# Patient Record
Sex: FEMALE | Race: WHITE | NOT HISPANIC OR LATINO | URBAN - METROPOLITAN AREA
[De-identification: names, ages, dates, MRNs, and addresses within clinical notes are randomized per-mention and may not be internally consistent; named-entity substitution may affect disease eponyms.]

---

## 2023-01-27 ENCOUNTER — OFFICE VISIT (OUTPATIENT)
Dept: OBGYN CLINIC | Facility: CLINIC | Age: 33
End: 2023-01-27

## 2023-01-27 VITALS
SYSTOLIC BLOOD PRESSURE: 112 MMHG | WEIGHT: 140 LBS | HEIGHT: 60 IN | BODY MASS INDEX: 27.48 KG/M2 | DIASTOLIC BLOOD PRESSURE: 76 MMHG

## 2023-01-27 DIAGNOSIS — Z01.419 WELL WOMAN EXAM WITH ROUTINE GYNECOLOGICAL EXAM: Primary | ICD-10-CM

## 2023-01-27 DIAGNOSIS — F84.0 AUTISM SPECTRUM DISORDER: ICD-10-CM

## 2023-01-27 DIAGNOSIS — Z12.4 ENCOUNTER FOR SCREENING FOR MALIGNANT NEOPLASM OF CERVIX: ICD-10-CM

## 2023-01-27 DIAGNOSIS — Z30.011 ENCOUNTER FOR INITIAL PRESCRIPTION OF CONTRACEPTIVE PILLS: ICD-10-CM

## 2023-01-27 DIAGNOSIS — Z11.51 SCREENING FOR HPV (HUMAN PAPILLOMAVIRUS): ICD-10-CM

## 2023-01-27 DIAGNOSIS — R10.2 PELVIC PAIN IN FEMALE: ICD-10-CM

## 2023-01-27 NOTE — PROGRESS NOTES
ASSESSMENT & PLAN:   Diagnoses and all orders for this visit:    Well woman exam with routine gynecological exam  -     Liquid-based pap, screening    Encounter for screening for malignant neoplasm of cervix  -     Liquid-based pap, screening    Screening for HPV (human papillomavirus)  -     Liquid-based pap, screening    Pelvic pain in female  -     Ambulatory referral to Physical Therapy; Future    Autism spectrum disorder  -     Ambulatory referral to Physical Therapy; Future    Encounter for initial prescription of contraceptive pills  -     Drospirenone 4 MG TABS; Take 1 tablet by mouth in the morning          The following were reviewed in today's visit: ASCCP guidelines, Gardisil vaccination, STD testing breast self exam, use and side effects of OCPs, exercise and healthy diet  Patient to return to office in yearly for annual exam      All questions have been answered to her satisfaction  CC:  Annual Gynecologic Examination  Chief Complaint   Patient presents with   • Gynecologic Exam     Pt is here for her yearly exam as a new pt  Pap is due today  Pt doing well pt would like to discuss bc for her period and pt states she had fibroids in the past  BP will be taken at end  HPI: Javier Ivey is a 34 y o  G0 who presents for annual gynecologic examination  She has the following concerns:  See attached form in media  Pt has a longstanding history of pelvic pain, abnormal bleeding  Was being treated and worked up in [place] but then moved to [place]  Currently going through a divorce so potentially losing health insurance in the near future  Has had pelvic ultrasound - records available in [facility]  Health Maintenance:    Exercise: intermittently - walking  Breast exams/breast awareness: no  Diet: well balanced diet      Past Medical History:   Diagnosis Date   • Fibroid 01/01/2016    Not sure when they were found but it was sometime after 2016       History reviewed   No pertinent surgical history  Past OB/Gyn History:  Period Cycle (Days): 34  Period Duration (Days): 5-6  Period Pattern: Regular  Menstrual Flow: Heavy  Menstrual Control: Maxi pad, Other (Comment) (period panties)  Dysmenorrhea: (!) Severe  Dysmenorrhea Symptoms: Cramping, Nausea, Headache, DiarrheaPatient's last menstrual period was 01/06/2023 (exact date)  Last Pap: 2/2022 : normal per patient  History of abnormal Pap smear: no  HPV vaccine completed: no    Patient is not currently sexually active  Has been with men in the past  Does not plan on being sexually active with men in the future       Current contraception: none      Family History  Family History   Problem Relation Age of Onset   • Asthma Mother    • Asthma Maternal Grandmother    • Ovarian cancer Maternal Grandmother    • Heart failure Maternal Grandmother    • Rashes / Skin problems Maternal Grandmother    • Stroke Maternal Grandmother    • Cancer Paternal Grandfather    • Asthma Brother        Family history of uterine or ovarian cancer: yes  Family history of breast cancer: no  Family history of colon cancer: no    Social History:  Social History     Socioeconomic History   • Marital status: Single     Spouse name: Not on file   • Number of children: Not on file   • Years of education: Not on file   • Highest education level: Not on file   Occupational History   • Not on file   Tobacco Use   • Smoking status: Never   • Smokeless tobacco: Never   Vaping Use   • Vaping Use: Never used   Substance and Sexual Activity   • Alcohol use: Never   • Drug use: Never   • Sexual activity: Not Currently     Partners: Female, Male     Birth control/protection: Abstinence, Condom Male   Other Topics Concern   • Not on file   Social History Narrative   • Not on file     Social Determinants of Health     Financial Resource Strain: Not on file   Food Insecurity: Not on file   Transportation Needs: Not on file   Physical Activity: Not on file   Stress: Not on file   Social Connections: Not on file   Intimate Partner Violence: Not on file   Housing Stability: Not on file     Domestic violence screen: negative    Allergies: Allergies   Allergen Reactions   • Gluten Meal - Food Allergy Abdominal Pain, Anxiety, Dizziness, Fatigue, GI Intolerance and Headache       Medications:    Current Outpatient Medications:   •  Drospirenone 4 MG TABS, Take 1 tablet by mouth in the morning, Disp: 90 tablet, Rfl: 4    Review of Systems:  Denies fevers, chills, unintentional weight loss, excessive fatigue, chest pain, shortness of breath, abdominal pain, nausea, vomiting, urinary incontinence, urinary frequency, vaginal bleeding, vaginal discharge  All other systems negative unless otherwise stated  Physical Exam:  /76 (BP Location: Right arm, Patient Position: Sitting, Cuff Size: Standard)   Ht 5' (1 524 m)   Wt 63 5 kg (140 lb)   LMP 01/06/2023 (Exact Date)   BMI 27 34 kg/m²  Body mass index is 27 34 kg/m²  GEN: The patient was alert and oriented x3, pleasant well-appearing female in no acute distress  HEENT:  Unremarkable, no anterior or posterior lymphadenopathy, no thyromegaly  CV:  Regular rate and rhythm, normal S1 and S2, no murmurs  RESP:  Clear to auscultation bilaterally, no wheezes, rales or rhonchi  BREAST:  Symmetric breasts with no palpable breast masses or obvious breast lesions  She has no retractions or nipple discharge  She has no axillary abnormalities or palpable masses  GI:  Soft, nontender, non-distended  MSK: bilateral lower extremities are nontender, no edema  : Normal appearing external female genitalia, normal appearing urethral meatus  On sterile speculum exam, normal appearing vaginal epithelium, no vaginal discharge, no bleeding, grossly normal appearing cervix  On bimanual exam, no cervical motion tenderness; uterus is smooth, mobile, 8 weeks size   There is tenderness with uterine manipulation, specifically with the abdominal hand and not the vaginal hand  No tenderness or fullness in the bilateral adnexa

## 2023-01-27 NOTE — PATIENT INSTRUCTIONS
Vikash Ribera meeting you today  I hope the following helps you remember what we talked about  Feel free to message or call the office if you have questions  Birth control [employer] OSEGUERA) - start taking it 5-7 days after the start of your next period  If you didn't get the savings card print out you can find it here: [email or web address]_Card_051722  pdf or just search [employer] on [employer] and navigate their website to find it  If your periods don't get better with Slynd we should consider doing another ultrasound to help figure out what is going on to cause you to have such symptomatic periods  Try pelvic floor physical therapy to help with pelvic pain - this can saumya helpful even if you are not planning to have penetrative sex  Consider the HPV vaccine  If you'd like to make an appointment for your first vaccination please call the office       All the best,  Dr Halle Guerrero

## 2023-01-30 LAB
HPV HR 12 DNA CVX QL NAA+PROBE: POSITIVE
HPV16 DNA CVX QL NAA+PROBE: NEGATIVE
HPV18 DNA CVX QL NAA+PROBE: NEGATIVE

## 2023-02-02 LAB
LAB AP GYN PRIMARY INTERPRETATION: NORMAL
Lab: NORMAL

## 2023-02-16 ENCOUNTER — OFFICE VISIT (OUTPATIENT)
Dept: FAMILY MEDICINE CLINIC | Facility: CLINIC | Age: 33
End: 2023-02-16

## 2023-02-16 VITALS
WEIGHT: 139 LBS | DIASTOLIC BLOOD PRESSURE: 78 MMHG | HEIGHT: 60 IN | RESPIRATION RATE: 16 BRPM | OXYGEN SATURATION: 99 % | TEMPERATURE: 98 F | SYSTOLIC BLOOD PRESSURE: 140 MMHG | BODY MASS INDEX: 27.29 KG/M2 | HEART RATE: 110 BPM

## 2023-02-16 DIAGNOSIS — E66.3 OVERWEIGHT WITH BODY MASS INDEX (BMI) OF 27 TO 27.9 IN ADULT: ICD-10-CM

## 2023-02-16 DIAGNOSIS — F84.0 AUTISM: ICD-10-CM

## 2023-02-16 DIAGNOSIS — Z13.6 SCREENING FOR CARDIOVASCULAR CONDITION: ICD-10-CM

## 2023-02-16 DIAGNOSIS — F90.2 ADHD (ATTENTION DEFICIT HYPERACTIVITY DISORDER), COMBINED TYPE: ICD-10-CM

## 2023-02-16 DIAGNOSIS — Z00.00 ANNUAL PHYSICAL EXAM: Primary | ICD-10-CM

## 2023-02-16 DIAGNOSIS — J45.998 SEASONAL ASTHMA: ICD-10-CM

## 2023-02-16 DIAGNOSIS — Z13.1 SCREENING FOR DIABETES MELLITUS: ICD-10-CM

## 2023-02-16 DIAGNOSIS — K90.0 CELIAC DISEASE: ICD-10-CM

## 2023-02-16 RX ORDER — LEVALBUTEROL TARTRATE 45 UG/1
1-2 AEROSOL, METERED ORAL EVERY 8 HOURS PRN
Qty: 15 G | Refills: 0 | Status: SHIPPED | OUTPATIENT
Start: 2023-02-16

## 2023-02-16 NOTE — PROGRESS NOTES
[facility]    NAME: Milly Amin  AGE: 34 y o  SEX: female  : 1990     DATE: 2023     Assessment and Plan:     Problem List Items Addressed This Visit        Digestive    Celiac disease       Other    ADHD (attention deficit hyperactivity disorder), combined type    Autism   Other Visit Diagnoses     Annual physical exam    -  Primary    Relevant Orders    Comprehensive metabolic panel    TSH, 3rd generation with Free T4 reflex    CBC and differential    Screening for diabetes mellitus        Screening for cardiovascular condition        Relevant Orders    Lipid panel    Seasonal asthma        Relevant Medications    levalbuterol (XOPENEX HFA) 45 mcg/act inhaler    Overweight with body mass index (BMI) of 27 to 27 9 in adult              Immunizations and preventive care screenings were discussed with patient today  Appropriate education was printed on patient's after visit summary  Counseling:  Alcohol/drug use: discussed moderation in alcohol intake, the recommendations for healthy alcohol use, and avoidance of illicit drug use  Dental Health: discussed importance of regular tooth brushing, flossing, and dental visits  Injury prevention: discussed safety/seat belts, safety helmets, smoke detectors, carbon dioxide detectors, and smoking near bedding or upholstery  Sexual health: discussed sexually transmitted diseases, partner selection, use of condoms, avoidance of unintended pregnancy, and contraceptive alternatives  · Exercise: the importance of regular exercise/physical activity was discussed  Recommend exercise 3-5 times per week for at least 30 minutes  BMI Counseling: Body mass index is 27 15 kg/m²   The BMI is above normal  Nutrition recommendations include decreasing portion sizes, encouraging healthy choices of fruits and vegetables, decreasing fast food intake, consuming healthier snacks, limiting drinks that contain sugar, moderation in carbohydrate intake and reducing intake of cholesterol  Exercise recommendations include moderate physical activity 150 minutes/week  No pharmacotherapy was ordered  Rationale for BMI follow-up plan is due to patient being overweight or obese  Depression Screening and Follow-up Plan: Patient's depression screening was positive with a PHQ-2 score of 3  Their PHQ-9 score was 9  No follow-ups on file  Chief Complaint:     Chief Complaint   Patient presents with   • New Patient Visit   • Physical Exam      History of Present Illness:     Adult Annual Physical   Patient here for a comprehensive physical exam  The patient reports problems - seasonal asthma- albutero lmakes her skay, anxious and caouses palpitations  Diet and Physical Activity  · Diet/Nutrition: well balanced diet and consuming 3-5 servings of fruits/vegetables daily  · Exercise: moderate cardiovascular exercise  Depression Screening  PHQ-2/9 Depression Screening    Little interest or pleasure in doing things: 1 - several days  Feeling down, depressed, or hopeless: 2 - more than half the days  Trouble falling or staying asleep, or sleeping too much: 1 - several days  Feeling tired or having little energy: 1 - several days  Poor appetite or overeatin - not at all  Feeling bad about yourself - or that you are a failure or have let yourself or your family down: 2 - more than half the days  Trouble concentrating on things, such as reading the newspaper or watching television: 1 - several days  Moving or speaking so slowly that other people could have noticed   Or the opposite - being so fidgety or restless that you have been moving around a lot more than usual: 0 - not at all  Thoughts that you would be better off dead, or of hurting yourself in some way: 1 - several days  PHQ-2 Score: 3  PHQ-2 Interpretation: POSITIVE depression screen  PHQ-9 Score: 9   PHQ-9 Interpretation: Mild depression        General Health  · Sleep: sleeps well  · Hearing: grossly normal   · Vision: goes for regular eye exams  · Dental: regular dental visits  /GYN Health  · Last menstrual period: Patient's last menstrual period was 02/06/2023 (approximate)  ·   · Contraceptive method: oral contraceptives  · History of STDs?: HPV     Review of Systems:     Review of Systems   Constitutional: Negative for fatigue and fever  HENT: Negative for congestion, facial swelling, mouth sores, rhinorrhea, sore throat and trouble swallowing  Eyes: Negative for pain and redness  Respiratory: Negative for cough, shortness of breath and wheezing  Cardiovascular: Negative for chest pain, palpitations and leg swelling  Gastrointestinal: Negative for abdominal pain, blood in stool, constipation, diarrhea and nausea  Genitourinary: Negative for dysuria, hematuria and urgency  Musculoskeletal: Negative for arthralgias, back pain and myalgias  Skin: Negative for rash and wound  Neurological: Negative for seizures, syncope and headaches  Hematological: Negative for adenopathy  Psychiatric/Behavioral: Negative for agitation and behavioral problems  Past Medical History:     Past Medical History:   Diagnosis Date   • Allergic     I always feel allergic to something, but I haven't been tested for anything specific   • Anxiety     I'm not sure that I've ever not had anxiety   • Asthma     Maybe? Everyone in my family has it, and I'm curious if I do too   • Depression     Probably on and off   I've never been diagnosed   • Eating disorder     In high school I had disordered eating because of a lot of pain and anxiety, but it was never diagnosed as an eating disorder   • Fibroid 01/01/2016    Not sure when they were found but it was sometime after 2016   • Headache(784 0)     Headaches happen every once in awhile, mostly around my period   • Visual impairment     I wear glasses, and have since I was 7 Past Surgical History:     History reviewed  No pertinent surgical history     Social History:     Social History     Socioeconomic History   • Marital status: Single     Spouse name: None   • Number of children: None   • Years of education: None   • Highest education level: None   Occupational History   • None   Tobacco Use   • Smoking status: Never   • Smokeless tobacco: Never   Vaping Use   • Vaping Use: Never used   Substance and Sexual Activity   • Alcohol use: Never   • Drug use: Never   • Sexual activity: Not Currently     Partners: Female, Male     Birth control/protection: Abstinence, Condom Male     Comment: male partner in the past, but not currently, and never again   Other Topics Concern   • None   Social History Narrative   • None     Social Determinants of Health     Financial Resource Strain: Not on file   Food Insecurity: Not on file   Transportation Needs: Not on file   Physical Activity: Not on file   Stress: Not on file   Social Connections: Not on file   Intimate Partner Violence: Not on file   Housing Stability: Not on file      Family History:     Family History   Problem Relation Age of Onset   • Asthma Mother    • Depression Mother         I don't know if she was every diagnosed, but she definitely has seemed depressed a lot   • Learning disabilities Mother         never diagnosed, but she suspects she has dsylexia   • ADD / ADHD Mother         highly suspected but not diagnosed   • Anxiety disorder Mother         not diagnosed but very anxious   • OCD Mother         not diagnosed but highly suspected   • Asthma Maternal Grandmother    • Ovarian cancer Maternal Grandmother    • Heart failure Maternal Grandmother    • Rashes / Skin problems Maternal Grandmother    • Stroke Maternal Grandmother    • Cancer Maternal Grandmother         ovarian cancer   • Anxiety disorder Maternal Grandmother    • Cancer Paternal Grandfather    • Asthma Brother    • Anxiety disorder Brother         Was treated for anxiety several years ago but is doing much better now and not on meds   • Anxiety disorder Father         not diagnosed but very anxious      Current Medications:     Current Outpatient Medications   Medication Sig Dispense Refill   • Drospirenone 4 MG TABS Take 1 tablet by mouth in the morning 90 tablet 4   • levalbuterol (XOPENEX HFA) 45 mcg/act inhaler Inhale 1-2 puffs every 8 (eight) hours as needed for wheezing 15 g 0     No current facility-administered medications for this visit  Allergies: Allergies   Allergen Reactions   • Albuterol Palpitations   • Gluten Meal - Food Allergy Abdominal Pain, Anxiety, Dizziness, Fatigue, GI Intolerance and Headache      Physical Exam:     /78 (BP Location: Left arm, Patient Position: Sitting, Cuff Size: Standard)   Pulse (!) 110   Temp 98 °F (36 7 °C) (Tympanic)   Resp 16   Ht 5' (1 524 m)   Wt 63 kg (139 lb)   LMP 02/06/2023 (Approximate)   SpO2 99%   BMI 27 15 kg/m²     Physical Exam  Vitals and nursing note reviewed  Constitutional:       Appearance: She is well-developed  HENT:      Head: Normocephalic and atraumatic  Right Ear: Tympanic membrane, ear canal and external ear normal       Left Ear: Tympanic membrane, ear canal and external ear normal       Nose: Nose normal       Mouth/Throat:      Pharynx: No oropharyngeal exudate  Eyes:      General: No scleral icterus  Right eye: No discharge  Left eye: No discharge  Conjunctiva/sclera: Conjunctivae normal       Pupils: Pupils are equal, round, and reactive to light  Neck:      Thyroid: No thyromegaly  Cardiovascular:      Rate and Rhythm: Normal rate and regular rhythm  Heart sounds: No murmur heard  No gallop  Pulmonary:      Effort: Pulmonary effort is normal  No respiratory distress  Breath sounds: Normal breath sounds  No wheezing or rales  Abdominal:      Palpations: Abdomen is soft  Tenderness: There is no abdominal tenderness  Musculoskeletal:         General: No tenderness or deformity  Cervical back: Normal range of motion  Right lower leg: No edema  Left lower leg: No edema  Lymphadenopathy:      Cervical: No cervical adenopathy  Skin:     General: Skin is warm  Capillary Refill: Capillary refill takes less than 2 seconds  Findings: No erythema or rash  Neurological:      Mental Status: She is alert and oriented to person, place, and time  Deep Tendon Reflexes: Reflexes normal    Psychiatric:         Behavior: Behavior normal          Thought Content:  Thought content normal          Judgment: Judgment normal           Toby Yost MD   [address] [place] [address]

## 2023-02-16 NOTE — PATIENT INSTRUCTIONS

## 2023-02-22 DIAGNOSIS — R53.83 OTHER FATIGUE: ICD-10-CM

## 2023-02-22 DIAGNOSIS — E55.9 VITAMIN D DEFICIENCY: ICD-10-CM

## 2023-02-22 DIAGNOSIS — K90.0 CELIAC DISEASE: Primary | ICD-10-CM

## 2023-02-23 ENCOUNTER — EVALUATION (OUTPATIENT)
Dept: PHYSICAL THERAPY | Facility: REHABILITATION | Age: 33
End: 2023-02-23

## 2023-02-23 DIAGNOSIS — N94.6 DYSMENORRHEA: ICD-10-CM

## 2023-02-23 DIAGNOSIS — R10.2 PELVIC PAIN IN FEMALE: Primary | ICD-10-CM

## 2023-02-23 DIAGNOSIS — F84.0 AUTISM SPECTRUM DISORDER: ICD-10-CM

## 2023-02-23 NOTE — PROGRESS NOTES
PT Evaluation     Today's date: 2023  Patient name: Edwige Garner  : 1990  MRN: [id number]  Referring provider: Candy Wright DO  Dx:   Encounter Diagnosis     ICD-10-CM    1  Pelvic pain in female  R10 2 Ambulatory referral to Physical Therapy      2  Autism spectrum disorder  F84 0 Ambulatory referral to Physical Therapy      3  Dysmenorrhea  N94 6           Start Time:   Stop Time:   Total time in clinic (min): 37 minutes    Assessment  Assessment details: Edwige Garner is a 34y o  year old female who presents to [facility] with Pelvic pain in female  (primary encounter diagnosis)  Autism spectrum disorder  Patient was referred for pelvic pain, specifically pain during her menstrual cycles, however expresses that this is not her main concern at this time  Majority of today's session focused on education as listed below  Patient deferred internal pelvic muscle exam but agreeable to external abdominal and pelvic floor assessment  She presents with tenderness to lower abdomen, hip musculature and low back  She would benefit from a program focused on diaphragmatic breathing, gentle core engagement and stretching to manage pain and improve coordination  Rachel Bhandari presents with the impairments as listed above and would benefit from Physical Therapy to address these impairments, improved quality of life and to maximize function  Impairments: activity intolerance, impaired physical strength and lacks appropriate home exercise program  Barriers to therapy: Financial    Goals  Short-Term Goals: 2-4 weeks  1  Patient will report decreased abdominal pain <5/10  Long-Term Goals: 4-6 weeks  1  Patient independent with HEP at time of discharge  Plan  Plan details: Thank you for opportunity to participate in the care of Edwige Garner      Patient would benefit from: skilled physical therapy and PT eval  Planned therapy interventions: abdominal trunk stabilization, home exercise program, coordination, therapeutic exercise, therapeutic activities, stretching, strengthening, patient education, postural training and neuromuscular re-education  Frequency: 1x week  Duration in visits: 5  Duration in weeks: 5  Plan of Care beginning date: 2/23/2023  Plan of Care expiration date: 3/30/2023  Treatment plan discussed with: patient        PT Pelvic Floor Subjective:   History of Present Illness:   Patient is a 34 y o  presenting to physical therapy with complaints of pelvic pain  Patient reports she has always had very painful periods and has worsened in the past few years  She reports she can get sharp, stabbing pains in the lower abdomen that radiates to the hips and down to her feet  However patient states that this pelvic pain is not her main concern  She states "I don't think that is the main cause of my issues  I want to work on being less wobbly  I can't feel my body in space and feel like my muscles aren't working right  I run into things a lot "     Social Support:     Lives with: brother and sister in law      Relationship status:     Work status: employed part time ()    History of Depression: yes  Diet and Exercise:    Diet:gluten free    Exercise type: walking    Walking the dog  Exercise intolerance - feels nauseous and dizzy   OB/ gyn History    Gestational History:     Prior Pregnancy: No      Menstrual History:      Menstrual irregularities regular menses (35 day cycles - heavy flow and clotting)    Painful periods:  Difficulty managing menstrual pain    Tolerates tampons: no (secondary to pain)    Birth control method: birth control pills (2 weeks )  Prescribed birth control for pain management  Bladder Function:     Voiding Difficulties negative for: urgency and frequent urination      Voiding Difficulties comments:     Voiding frequency: every 1-2 hours    Urinary leakage: no urine leakage    Nocturia (episodes per night): 1 (occasionally )    Painful urination: No      Fluid Intake Type: Water and tea    Intake (ounces): Intake (ounces) comment: Water: 18 oz x 6   Sparkling water: 1 can   Tea: 1-2 cups half caff  Bowel Function:     Bowel frequency: daily    Stool softener use: no stool softeners  Sexual Function:     Sexually Active:  Not sexually active (been over a year )    Pain during intercourse: Yes    Pain:     Current pain ratin    At best pain ratin    At worst pain rating:  10    Quality:  Cramping    Aggravating factors:  Menses    Duration of symptoms:  More than 1 day (5 days prior to before and throughout period but first 2 days of her cycle are the worst)    Relieving factors:  Medications and heat    Progression:  Worsening  Treatments:     Current treatment: physical therapy    Patient Goals:      Other patient goals:  Cant feel where she is in space, clenching muscles       Objective   Pelvic Floor Exam   Abdominal assessment: Tenderness at the linea alba - increased distally below umbilicus  Tenderness to external palpation over the bladder   Tenderness to bilateral piriformis and erector spinae         Flowsheet Rows    Flowsheet Row Most Recent Value   PT/OT G-Codes    Current Score 17   Projected Score 0             Precautions: autism, ADHD, anxiety/depression, SELF PAY      Manuals                                                                 Neuro Re-Ed             Diaphragmatic breathing nv            TA ADIM nv            DKTC             Davion pose             TA +  Hip abduction isometric             TA + Hip adduction isometric                          Ther Ex                                                    Ther Activity                                       Modalities

## 2023-03-02 ENCOUNTER — APPOINTMENT (OUTPATIENT)
Dept: PHYSICAL THERAPY | Facility: REHABILITATION | Age: 33
End: 2023-03-02

## 2023-03-02 ENCOUNTER — TELEPHONE (OUTPATIENT)
Dept: OBGYN CLINIC | Facility: CLINIC | Age: 33
End: 2023-03-02

## 2023-03-02 NOTE — TELEPHONE ENCOUNTER
----- Message from Jorge Mackey sent at 3/2/2023  2:40 PM EST -----  Regarding: When to start birth control  Contact: [phone]  Thanks for your response  I haven't been feeling much better, but I'll keep taking them and update again next month  I do have another question  Sorry for all of the questions  When can I expect to get my period? I have 2 active pills left  And then once I finish this pack, do I just immediately start with the new one, or do I have to wait until I get my period and have had it for a few days like I did when I started?

## 2023-03-08 ENCOUNTER — OFFICE VISIT (OUTPATIENT)
Dept: PHYSICAL THERAPY | Facility: REHABILITATION | Age: 33
End: 2023-03-08

## 2023-03-08 DIAGNOSIS — R10.2 PELVIC PAIN IN FEMALE: Primary | ICD-10-CM

## 2023-03-08 DIAGNOSIS — F84.0 AUTISM SPECTRUM DISORDER: ICD-10-CM

## 2023-03-08 DIAGNOSIS — N94.6 DYSMENORRHEA: ICD-10-CM

## 2023-03-08 NOTE — PROGRESS NOTES
Daily Note     Today's date: 3/8/2023  Patient name: Sheree Saba  : 1990  MRN: [id number]  Referring provider: Norah Cerda DO  Dx:   Encounter Diagnosis     ICD-10-CM    1  Pelvic pain in female  R10 2       2  Autism spectrum disorder  F84 0       3  Dysmenorrhea  N94 6           Start Time: 1111          Subjective: Patient reports no changes over the last two weeks  Patient reports she stopped taking the birth control pill 3 days ago because of side effects  She has not had her period but had some spotting/cramping yesterday  Objective: See treatment diary below      Assessment: Initiated plan of care this visit with a focus on diaphragmatic breathing, gentle core engagement and gentle hip isometrics  Modified exercises throughout session based on patient response and some difficulty with multi-tasking/coordination  She has difficulty with relaxation of hip adductors but improved with progressive repetitions  [facility] and PT mutually agree to transition to HEP at this time  Provided patient with HEP for overall program of breathing, stretching and gentle strengthening  [facility] is encouraged to contact PT with any questions or concerns in the future  Plan: Plan to discharge with comprehensive HEP for continued and maintained gains made in PT         Precautions: autism, ADHD, anxiety/depression, SELF PAY      Manuals 2/23 3/8                                                               Ther Ex             Diaphragmatic breathing nv Done           TA ADIM nv 10x5"           DKTC             Davion pose  2'           TA +  Hip abduction isometric  Pink 2x10           TA + Hip adduction isometric  5x focus on relaxation phase           Pball rocks A/P  10x                        Seated piriformis stretch  3x30" b/l           Standing hip adductor stretch  hard                        Ther Activity                                       Modalities

## 2023-03-15 ENCOUNTER — APPOINTMENT (OUTPATIENT)
Dept: PHYSICAL THERAPY | Facility: REHABILITATION | Age: 33
End: 2023-03-15

## 2023-04-28 ENCOUNTER — OFFICE VISIT (OUTPATIENT)
Dept: OBGYN CLINIC | Facility: CLINIC | Age: 33
End: 2023-04-28

## 2023-04-28 VITALS
DIASTOLIC BLOOD PRESSURE: 68 MMHG | HEIGHT: 60 IN | WEIGHT: 136 LBS | SYSTOLIC BLOOD PRESSURE: 124 MMHG | BODY MASS INDEX: 26.7 KG/M2

## 2023-04-28 DIAGNOSIS — N80.9 ENDOMETRIOSIS: ICD-10-CM

## 2023-04-28 DIAGNOSIS — Z84.2 FAMILY HISTORY OF ENDOMETRIOSIS IN FIRST DEGREE RELATIVE: ICD-10-CM

## 2023-04-28 DIAGNOSIS — R10.2 PELVIC PAIN: Primary | ICD-10-CM

## 2023-04-28 RX ORDER — ELAGOLIX 150 MG/1
1 TABLET, FILM COATED ORAL DAILY
Qty: 90 TABLET | Refills: 4 | Status: SHIPPED | OUTPATIENT
Start: 2023-04-28

## 2023-04-28 NOTE — PROGRESS NOTES
Assessment Anisha High was seen today for follow-up  Diagnoses and all orders for this visit:    Pelvic pain  -     Elagolix Sodium (Orilissa) 150 MG TABS; Take 1 tablet by mouth daily  -     US pelvis complete w transvaginal; Future    Endometriosis  -     Elagolix Sodium (Orilissa) 150 MG TABS; Take 1 tablet by mouth daily  -     US pelvis complete w transvaginal; Future    Family history of endometriosis in first degree relative  -     Elagolix Sodium (Orilissa) 150 MG TABS; Take 1 tablet by mouth daily  -     US pelvis complete w transvaginal; Future         Plan   Discussed possible etiologies of pain, including endometriosis  Reviewed etiology of endometriosis, strong genetic ties, diagnosis, treatment  Discussed that traditionally we diagnose endo via laparoscopy but with the Christianity of more effect medication options we try to avoid surgery if possible  Patient very motivated to avoid surgery  Will Rx Martin Shown to see if this helps her symptoms while not causing undesired side effects  Discussed common side effects of bleeding pattern changes, amenorrhea, night sweats, hot flushes  Provided savings card  Repeat US ordered to evaluate if fibroid growth could be contributing to pain  Encouraged pt to ask her mom if she has had genetic testing given the family history of ovarian cancer  Discussed HPV vaccination side effects    Pt will f/up with Dr Angel Loja for annual, sooner if needed  Subjective     Yusuf Hyman is a 34 y o  adult here for a problem visit  Patient is complaining of pelvic pain  Tried Slynd with no relief and very disruptive side effects  Felt brain fog, dissociation  Does not (understandably) want to try this again  Majority of pain is during ovulation and during her period  Feels swollen/bloating, pain radiates down her legs, has spotting - dark brown blood with some thin clots, nausea, lower back pain during ovulation  Also has pain during her period which she has always had   Has been at least 6-7 years since her pain has been not just during her period but also during ovulation  Went for an appointment with pelvic floor PT but did not feel like the recommendations were helpful  Tried some of the stretching techniques offered during her next episode of pain and had significant worsening of her pain  Mom has a history of endometriosis which was ultimately managed surgically, pt not sure of exactly what surgery she has had  Maternal Grandmother had ovarian cancer  Pt not sure if anyone in the family has had genetic testing  Patient Active Problem List   Diagnosis   • ADHD (attention deficit hyperactivity disorder), combined type   • Autism   • Celiac disease         Gynecologic History  Patient's last menstrual period was 2023 (exact date)  Obstetric History  OB History    Para Term  AB Living   0 0 0 0 0 0   SAB IAB Ectopic Multiple Live Births   0 0 0 0 0       Past Medical/Surgical/Family/Social History  The following portions of the patient's history were reviewed and updated as appropriate: allergies, current medications, past family history, past medical history, past social history, past surgical history and problem list     Allergies  Albuterol and Gluten meal - food allergy    Medications    Current Outpatient Medications:   •  levalbuterol (XOPENEX HFA) 45 mcg/act inhaler, Inhale 1-2 puffs every 8 (eight) hours as needed for wheezing, Disp: 15 g, Rfl: 0      Review of Systems  Constitutional: no fever, feels well  Gastrointestinal: no complaints of abdominal pain, constipation  Genitourinary : see HPI       Objective     /68 (BP Location: Left arm, Patient Position: Sitting, Cuff Size: Standard)   Ht 5' (1 524 m)   Wt 61 7 kg (136 lb)   LMP 2023 (Exact Date)   BMI 26 56 kg/m²     GEN: The patient was alert and oriented x3, pleasant well-appearing female in no acute distress     CV: Regular rate  PULM: nonlabored respirations  MSK: Normal gait  Skin: warm, dry  Neuro: no focal deficits  Psych: normal affect and judgement, cooperative

## 2023-05-10 LAB
ALBUMIN SERPL-MCNC: 4.3 G/DL (ref 3.6–5.1)
ALBUMIN/GLOB SERPL: 1.7 (CALC) (ref 1–2.5)
ALP SERPL-CCNC: 62 U/L (ref 31–125)
ALT SERPL-CCNC: 12 U/L (ref 6–29)
AST SERPL-CCNC: 19 U/L (ref 10–30)
BASOPHILS # BLD AUTO: 31 CELLS/UL (ref 0–200)
BASOPHILS NFR BLD AUTO: 0.6 %
BILIRUB SERPL-MCNC: 0.5 MG/DL (ref 0.2–1.2)
BUN SERPL-MCNC: 8 MG/DL (ref 7–25)
BUN/CREAT SERPL: NORMAL (CALC) (ref 6–22)
CALCIUM SERPL-MCNC: 9.3 MG/DL (ref 8.6–10.2)
CHLORIDE SERPL-SCNC: 104 MMOL/L (ref 98–110)
CHOLEST SERPL-MCNC: 161 MG/DL
CHOLEST/HDLC SERPL: 2.3 (CALC)
CO2 SERPL-SCNC: 25 MMOL/L (ref 20–32)
CREAT SERPL-MCNC: 0.81 MG/DL (ref 0.5–0.97)
EOSINOPHIL # BLD AUTO: 158 CELLS/UL (ref 15–500)
EOSINOPHIL NFR BLD AUTO: 3.1 %
ERYTHROCYTE [DISTWIDTH] IN BLOOD BY AUTOMATED COUNT: 12.6 % (ref 11–15)
GFR/BSA.PRED SERPLBLD CYS-BASED-ARV: 98 ML/MIN/1.73M2
GLOBULIN SER CALC-MCNC: 2.5 G/DL (CALC) (ref 1.9–3.7)
GLUCOSE SERPL-MCNC: 91 MG/DL (ref 65–99)
HCT VFR BLD AUTO: 41.3 % (ref 35–45)
HDLC SERPL-MCNC: 69 MG/DL
HGB BLD-MCNC: 14.1 G/DL (ref 11.7–15.5)
LDLC SERPL CALC-MCNC: 78 MG/DL (CALC)
LYMPHOCYTES # BLD AUTO: 1173 CELLS/UL (ref 850–3900)
LYMPHOCYTES NFR BLD AUTO: 23 %
MCH RBC QN AUTO: 30 PG (ref 27–33)
MCHC RBC AUTO-ENTMCNC: 34.1 G/DL (ref 32–36)
MCV RBC AUTO: 87.9 FL (ref 80–100)
MONOCYTES # BLD AUTO: 398 CELLS/UL (ref 200–950)
MONOCYTES NFR BLD AUTO: 7.8 %
NEUTROPHILS # BLD AUTO: 3341 CELLS/UL (ref 1500–7800)
NEUTROPHILS NFR BLD AUTO: 65.5 %
NONHDLC SERPL-MCNC: 92 MG/DL (CALC)
PLATELET # BLD AUTO: 241 THOUSAND/UL (ref 140–400)
PMV BLD REES-ECKER: 11.2 FL (ref 7.5–12.5)
POTASSIUM SERPL-SCNC: 3.7 MMOL/L (ref 3.5–5.3)
PROT SERPL-MCNC: 6.8 G/DL (ref 6.1–8.1)
RBC # BLD AUTO: 4.7 MILLION/UL (ref 3.8–5.1)
SODIUM SERPL-SCNC: 137 MMOL/L (ref 135–146)
T4 FREE SERPL-MCNC: 0.9 NG/DL (ref 0.8–1.8)
TRIGL SERPL-MCNC: 56 MG/DL
TSH SERPL-ACNC: 4.54 MIU/L
WBC # BLD AUTO: 5.1 THOUSAND/UL (ref 3.8–10.8)

## 2023-05-23 ENCOUNTER — HOSPITAL ENCOUNTER (OUTPATIENT)
Dept: RADIOLOGY | Age: 33
Discharge: HOME | End: 2023-05-23

## 2023-05-23 DIAGNOSIS — R10.2 PELVIC PAIN: ICD-10-CM

## 2023-05-23 DIAGNOSIS — Z84.2 FAMILY HISTORY OF ENDOMETRIOSIS IN FIRST DEGREE RELATIVE: ICD-10-CM

## 2023-05-23 DIAGNOSIS — N80.9 ENDOMETRIOSIS: ICD-10-CM

## 2023-08-30 ENCOUNTER — OFFICE VISIT (OUTPATIENT)
Dept: OBGYN CLINIC | Facility: CLINIC | Age: 33
End: 2023-08-30

## 2023-08-30 VITALS
HEIGHT: 60 IN | DIASTOLIC BLOOD PRESSURE: 78 MMHG | BODY MASS INDEX: 26.23 KG/M2 | WEIGHT: 133.6 LBS | SYSTOLIC BLOOD PRESSURE: 122 MMHG

## 2023-08-30 DIAGNOSIS — D25.2 INTRAMURAL AND SUBSEROUS LEIOMYOMA OF UTERUS: Primary | ICD-10-CM

## 2023-08-30 DIAGNOSIS — N84.0 ENDOMETRIAL POLYP: ICD-10-CM

## 2023-08-30 DIAGNOSIS — D25.1 INTRAMURAL AND SUBSEROUS LEIOMYOMA OF UTERUS: Primary | ICD-10-CM

## 2023-08-30 DIAGNOSIS — R10.2 PELVIC PAIN: ICD-10-CM

## 2023-08-30 PROCEDURE — 99215 OFFICE O/P EST HI 40 MIN: CPT | Performed by: OBSTETRICS & GYNECOLOGY

## 2023-08-31 NOTE — PROGRESS NOTES
Assessment/Plan:   Diagnoses and all orders for this visit:    Intramural and subserous leiomyoma of uterus    Pelvic pain    Endometrial polyp    We discussed her fibroid uterus and family planning and management options. At this time the large fibroids on her uterus are likely contributing to her pelvic pain associated with her menses. With the size of her fibroids we discussed that removal of her fibroids is likely to be more effective treatment rather than medical management. Emilio Coffey is not planning nor interested in future childbearing. We discussed that myomectomy is an option if future childbearing is desired due to risk of recurrence and risks related to the procedure including increased bleeding during the procedure. As Emilio Coffey does not desire future childbearing, discussed that removal of her entire uterus including her fibroids with it would be recommended. Discussed option for surigical management via total hysterectomy and bilateral salpingectomy explaining that it would entail removing uterus, cervix, and bilateral tubes. Discussed recommendation to leave ovaries if normal in appearance due to increased all cause mortality and continued benefit for cardiac and bone health as well as avoidance of surgical menopause. Reviewed recommendation for utilization of robotic assistance for her hysterectomy procedure and possible need for mini-laparotomy for removal of the specimen. Briefly reviewed preoperative procedure and the procedure in of itself. As patient has previously desired to avoid surgery, patient will consider our discussion today for possible treatment and reach out the office if surgical management and planning is desired.          I have spent a total time of 60 minutes on 08/30/23 in caring for this patient including Diagnostic results, Risks and benefits of tx options, Patient and family education, Impressions, Counseling / Coordination of care, Documenting in the medical record, Reviewing / ordering tests, medicine, procedures   and Obtaining or reviewing history  . Subjective:    Patient ID: Reta Libman is a 34 y.o. adult. Chief Complaint   Patient presents with   • Consult     Pt would like to discuss ultrasound that was completed on 05/23/2023. Pt states she is having excruciating abdominal pain. Maura Johnson is a 29yo G0 presenting today for discussion regarding her ultrasound results and her continued pelvic pain. She has been seeing Dr. Wenceslao Erickson in our office since she moved to the [facility] from [place] in January 2023. She has a long history of pelvic pain and abnormal bleeding. Prior ultrasound records are in media from prior evaluations in [place]. She was initially started on Slynd to help with pain that is exacerbated with ovulation and menstrual bleeding. Unfortunately slynd caused her to have exacerbations of suicidal ideations and Maura Johnson discontinued that medication. She was provided a prescription for Jean Claude March but was concerned for further side effects and thus did not start it. Her ultrasound is notable for a significant increase in size of her fibroids with 2 uterine fibroids at the uterine fundus measuring 8 and 9 cm. It was also notable for a 1 cm endometrial polyp within the uterine cavity. The following portions of the patient's history were reviewed and updated as appropriate: allergies, current medications, past family history, past medical history, past social history, past surgical history and problem list.    Review of Systems   Constitutional: Negative for chills, diaphoresis and fever. Respiratory: Negative for shortness of breath. Cardiovascular: Negative for chest pain. Gastrointestinal: Positive for abdominal pain. Negative for constipation, diarrhea, nausea and vomiting. Genitourinary: Positive for menstrual problem, pelvic pain and vaginal bleeding. Negative for difficulty urinating. Neurological: Positive for headaches.  Negative for dizziness. Psychiatric/Behavioral: The patient is nervous/anxious. Objective:  /78 (BP Location: Left arm, Patient Position: Sitting, Cuff Size: Standard)   Ht 5' (1.524 m)   Wt 60.6 kg (133 lb 9.6 oz)   LMP 08/07/2023 (Exact Date)   BMI 26.09 kg/m²   Physical Exam  Constitutional:       General: Juan Luis Mann is not in acute distress. Appearance: Normal appearance. Juan Luis Mann is not diaphoretic. HENT:      Head: Normocephalic and atraumatic. Pulmonary:      Effort: Pulmonary effort is normal. No respiratory distress. Musculoskeletal:      Right lower leg: No edema. Left lower leg: No edema. Neurological:      Mental Status: Juan Luis Mann is alert and oriented to person, place, and time. Skin:     General: Skin is warm and dry. Coloration: Skin is not pale. Psychiatric:         Mood and Affect: Mood normal.         Behavior: Behavior normal.         Thought Content: Thought content normal.         Judgment: Judgment normal.   Vitals and nursing note reviewed.

## 2023-09-19 ENCOUNTER — OFFICE VISIT (OUTPATIENT)
Dept: FAMILY MEDICINE CLINIC | Facility: CLINIC | Age: 33
End: 2023-09-19
Payer: COMMERCIAL

## 2023-09-19 VITALS
WEIGHT: 132.4 LBS | HEIGHT: 60 IN | SYSTOLIC BLOOD PRESSURE: 124 MMHG | BODY MASS INDEX: 26 KG/M2 | HEART RATE: 100 BPM | TEMPERATURE: 98.3 F | DIASTOLIC BLOOD PRESSURE: 70 MMHG | OXYGEN SATURATION: 99 %

## 2023-09-19 DIAGNOSIS — R79.89 ABNORMAL TSH: ICD-10-CM

## 2023-09-19 DIAGNOSIS — R05.1 ACUTE COUGH: Primary | ICD-10-CM

## 2023-09-19 PROCEDURE — 99213 OFFICE O/P EST LOW 20 MIN: CPT | Performed by: INTERNAL MEDICINE

## 2023-09-19 RX ORDER — METHYLPREDNISOLONE 4 MG/1
TABLET ORAL
Qty: 21 EACH | Refills: 0 | Status: SHIPPED | OUTPATIENT
Start: 2023-09-19

## 2023-09-19 RX ORDER — AZITHROMYCIN 250 MG/1
TABLET, FILM COATED ORAL
Qty: 6 TABLET | Refills: 0 | Status: SHIPPED | OUTPATIENT
Start: 2023-09-19 | End: 2023-09-24

## 2023-09-19 RX ORDER — CETIRIZINE HYDROCHLORIDE 10 MG/1
10 TABLET ORAL DAILY
COMMUNITY

## 2023-09-19 RX ORDER — TRIAMCINOLONE ACETONIDE 55 UG/1
SPRAY, METERED NASAL DAILY
COMMUNITY

## 2023-09-19 NOTE — PROGRESS NOTES
Name: Adarsh Baldwin      : 1990      MRN: [id number]  Encounter Provider: Jana Ryan MD  Encounter Date: 2023   Encounter department: [facility]     1. Acute cough  -     azithromycin (Zithromax) 250 mg tablet; Take 2 tablets (500 mg total) by mouth daily for 1 day, THEN 1 tablet (250 mg total) daily for 4 days. -     methylPREDNISolone 4 MG tablet therapy pack; Use as directed on package    2. Abnormal TSH  -     TSH, 3rd generation with Free T4 reflex; Future           Subjective      She has had a nonproductive cough has been unresponsive to an inhaler for several weeks. Cough  This is a new problem. The current episode started more than 1 month ago. The problem has been waxing and waning. The problem occurs every few minutes. The cough is non-productive. Associated symptoms include shortness of breath. Pertinent negatives include no chest pain, chills, ear pain, fever, headaches, heartburn, hemoptysis, myalgias, nasal congestion, postnasal drip, rash, sore throat, weight loss or wheezing. The symptoms are aggravated by dust, exercise and fumes. Adarsh Baldwin has tried a beta-agonist inhaler, cool air and OTC cough suppressant for the symptoms. The treatment provided mild relief. There is no history of asthma, bronchitis or environmental allergies. Review of Systems   Constitutional: Negative for chills, fever and weight loss. HENT: Negative for congestion, ear pain, postnasal drip and sore throat. Eyes: Negative for pain and visual disturbance. Respiratory: Positive for cough and shortness of breath. Negative for hemoptysis and wheezing. Cardiovascular: Negative for chest pain and palpitations. Gastrointestinal: Negative for abdominal pain, heartburn and vomiting. Genitourinary: Negative for dysuria and hematuria. Musculoskeletal: Negative for arthralgias, back pain and myalgias. Skin: Negative for color change and rash. Allergic/Immunologic: Negative for environmental allergies. Neurological: Negative for seizures, syncope and headaches. All other systems reviewed and are negative. Current Outpatient Medications on File Prior to Visit   Medication Sig   • cetirizine (ZyrTEC) 10 mg tablet Take 10 mg by mouth daily   • levalbuterol (XOPENEX HFA) 45 mcg/act inhaler Inhale 1-2 puffs every 8 (eight) hours as needed for wheezing   • Triamcinolone Acetonide (Nasacort Allergy 24HR) 55 MCG/ACT nasal spray by Each Nare route daily   • [DISCONTINUED] Elagolix Sodium (Orilissa) 150 MG TABS Take 1 tablet by mouth daily       Objective     /70 (BP Location: Right arm, Patient Position: Sitting, Cuff Size: Standard)   Pulse 100   Temp 98.3 °F (36.8 °C) (Temporal)   Ht 5' (1.524 m)   Wt 60.1 kg (132 lb 6.4 oz)   LMP 08/07/2023 (Exact Date)   SpO2 99%   BMI 25.86 kg/m²     Physical Exam  Constitutional:       General: Steven Grullon is not in acute distress. Appearance: Normal appearance. Steven Clear is well-developed. Steven Clear is not ill-appearing. HENT:      Head: Normocephalic. Right Ear: Tympanic membrane and external ear normal.      Left Ear: Tympanic membrane and external ear normal.      Nose: Nose normal. No congestion. Mouth/Throat:      Mouth: Mucous membranes are moist.      Pharynx: No oropharyngeal exudate. Eyes:      Conjunctiva/sclera: Conjunctivae normal.      Pupils: Pupils are equal, round, and reactive to light. Neck:      Thyroid: No thyromegaly. Vascular: No JVD. Cardiovascular:      Rate and Rhythm: Normal rate and regular rhythm. Heart sounds: Normal heart sounds. No murmur heard. No gallop. Pulmonary:      Effort: Pulmonary effort is normal. No respiratory distress. Breath sounds: Normal breath sounds. No wheezing, rhonchi or rales. Abdominal:      General: Bowel sounds are normal. There is no distension. Palpations: Abdomen is soft.  There is no mass.      Tenderness: There is no abdominal tenderness. Musculoskeletal:         General: No tenderness. Normal range of motion. Cervical back: Normal range of motion and neck supple. Right lower leg: No edema. Left lower leg: No edema. Lymphadenopathy:      Cervical: No cervical adenopathy. Skin:     General: Skin is warm and dry. Findings: No rash. Neurological:      General: No focal deficit present. Mental Status: Eleanor Sahni is alert and oriented to person, place, and time. Cranial Nerves: No cranial nerve deficit. Coordination: Coordination normal.   Psychiatric:         Behavior: Behavior normal.         Thought Content:  Thought content normal.         Judgment: Judgment normal.       Corrinne Balloon, MD

## 2023-10-04 ENCOUNTER — RA CDI HCC (OUTPATIENT)
Dept: OTHER | Facility: HOSPITAL | Age: 33
End: 2023-10-04

## 2023-10-04 NOTE — PROGRESS NOTES
[address] [facility] coding opportunities       Chart reviewed, no opportunity found: CHART REVIEWED, NO OPPORTUNITY FOUND        Patients Insurance        Commercial Insurance: Ming Little

## 2023-10-11 ENCOUNTER — OFFICE VISIT (OUTPATIENT)
Dept: FAMILY MEDICINE CLINIC | Facility: CLINIC | Age: 33
End: 2023-10-11
Payer: COMMERCIAL

## 2023-10-11 VITALS
WEIGHT: 133 LBS | RESPIRATION RATE: 16 BRPM | SYSTOLIC BLOOD PRESSURE: 112 MMHG | BODY MASS INDEX: 26.11 KG/M2 | HEIGHT: 60 IN | HEART RATE: 89 BPM | OXYGEN SATURATION: 96 % | DIASTOLIC BLOOD PRESSURE: 74 MMHG | TEMPERATURE: 97.3 F

## 2023-10-11 DIAGNOSIS — J45.998 SEASONAL ASTHMA: ICD-10-CM

## 2023-10-11 DIAGNOSIS — F90.2 ADHD (ATTENTION DEFICIT HYPERACTIVITY DISORDER), COMBINED TYPE: Primary | ICD-10-CM

## 2023-10-11 DIAGNOSIS — R79.89 ABNORMAL TSH: ICD-10-CM

## 2023-10-11 PROCEDURE — 99214 OFFICE O/P EST MOD 30 MIN: CPT | Performed by: FAMILY MEDICINE

## 2023-10-11 RX ORDER — DEXTROAMPHETAMINE SACCHARATE, AMPHETAMINE ASPARTATE MONOHYDRATE, DEXTROAMPHETAMINE SULFATE AND AMPHETAMINE SULFATE 1.25; 1.25; 1.25; 1.25 MG/1; MG/1; MG/1; MG/1
5 CAPSULE, EXTENDED RELEASE ORAL EVERY MORNING
Qty: 30 CAPSULE | Refills: 0 | Status: SHIPPED | OUTPATIENT
Start: 2023-10-11

## 2023-10-11 NOTE — PROGRESS NOTES
Depression Screening and Follow-up Plan: Patient was screened for depression during today's encounter. They screened negative with a PHQ-2 score of 1. Assessment/Plan:    1. ADHD (attention deficit hyperactivity disorder), combined type  -     amphetamine-dextroamphetamine (ADDERALL XR, 5MG,) 5 MG 24 hr capsule; Take 1 capsule (5 mg total) by mouth every morning Max Daily Amount: 5 mg    2. Abnormal TSH    3. Seasonal asthma  -     Spacer Device for Inhaler        Patient Instructions   Try guaifenisin to break up the mucus (mucinex, delsym)  Use xopenex inhaler to open the chest, can take every 6 hours as needed for wheezing  Trial of adderall, recheck in office in one month  Consider levothyroxine for thyroid which may be responsible for a lot of your symptoms, TSH usually best below 2.0, you are at 4.84    Return in about 4 weeks (around 11/8/2023). Subjective:      Patient ID: Yumiko Coronado is a 28 y.o. adult. Chief Complaint   Patient presents with    chronic conditions       Here for f/u. C/o ADHD, dx'd in early 2020 by a psychologist. Has never been treated. And didn't f/u psychiatry. And then pandemic hit. 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 Trouble sustaining income to support herself. Living with her parents. Autism, sensory processing. Had anxiety since childhood. Was a straight A student, but was a quiet student. Was not offered any accommodations. Sees therapist twice a month. Cannot finish tasks, structure imposed by other people, makes her panic. Social anxiety. Still feels some tightness in her chest, can't expectorate, doesn't have the coordination for the inhaler. She admits to being clumsy. Wakes a few times a night.          The following portions of the patient's history were reviewed and updated as appropriate: allergies, current medications, past family history, past medical history, past social history, past surgical history and problem list.    Review of Systems   Constitutional: Negative for fatigue and fever. HENT:  Negative for congestion. Eyes:  Negative for visual disturbance. Respiratory:  Positive for wheezing. Negative for chest tightness and shortness of breath. Cardiovascular:  Negative for chest pain and palpitations. Gastrointestinal:  Negative for abdominal pain. Genitourinary:  Negative for difficulty urinating. Musculoskeletal:  Negative for arthralgias. Neurological:  Negative for headaches. Hematological:  Does not bruise/bleed easily. Psychiatric/Behavioral:  Positive for decreased concentration. The patient is nervous/anxious and is hyperactive. Current Outpatient Medications   Medication Sig Dispense Refill    amphetamine-dextroamphetamine (ADDERALL XR, 5MG,) 5 MG 24 hr capsule Take 1 capsule (5 mg total) by mouth every morning Max Daily Amount: 5 mg 30 capsule 0    cetirizine (ZyrTEC) 10 mg tablet Take 10 mg by mouth daily      levalbuterol (XOPENEX HFA) 45 mcg/act inhaler Inhale 1-2 puffs every 8 (eight) hours as needed for wheezing 15 g 0    Triamcinolone Acetonide (Nasacort Allergy 24HR) 55 MCG/ACT nasal spray by Each Nare route daily      methylPREDNISolone 4 MG tablet therapy pack Use as directed on package (Patient not taking: Reported on 10/11/2023) 21 each 0     No current facility-administered medications for this visit. Objective:    /74 (BP Location: Right arm, Patient Position: Sitting, Cuff Size: Standard)   Pulse 89   Temp (!) 97.3 °F (36.3 °C) (Temporal)   Resp 16   Ht 5' (1.524 m)   Wt 60.3 kg (133 lb)   LMP 10/05/2023 (Approximate)   SpO2 96%   BMI 25.97 kg/m²        Physical Exam  Vitals reviewed. Constitutional:       Appearance: Normal appearance. Wilfredo Erwin is well-developed. Cardiovascular:      Rate and Rhythm: Normal rate and regular rhythm. Heart sounds: Normal heart sounds. Pulmonary:      Effort: Pulmonary effort is normal.      Breath sounds: Normal breath sounds.    Skin: General: Skin is warm. Neurological:      General: No focal deficit present. Mental Status: Toribio Gutierrez is alert and oriented to person, place, and time. Psychiatric:         Mood and Affect: Mood normal.         Behavior: Behavior normal.         Thought Content:  Thought content normal.         Judgment: Judgment normal.                Edison Triana MD

## 2023-10-11 NOTE — PATIENT INSTRUCTIONS
Try guaifenisin to break up the mucus (mucinex, delsym)  Use xopenex inhaler to open the chest, can take every 6 hours as needed for wheezing  Trial of adderall, recheck in office in one month  Consider levothyroxine for thyroid which may be responsible for a lot of your symptoms, TSH usually best below 2.0, you are at 4.84

## 2023-11-18 PROBLEM — R05.1 ACUTE COUGH: Status: RESOLVED | Noted: 2023-09-19 | Resolved: 2023-11-18

## 2023-12-11 ENCOUNTER — PATIENT MESSAGE (OUTPATIENT)
Dept: FAMILY MEDICINE CLINIC | Facility: CLINIC | Age: 33
End: 2023-12-11

## 2023-12-11 DIAGNOSIS — J45.998 SEASONAL ASTHMA: ICD-10-CM

## 2023-12-12 RX ORDER — LEVALBUTEROL TARTRATE 45 UG/1
1-2 AEROSOL, METERED ORAL EVERY 8 HOURS PRN
Qty: 15 G | Refills: 5 | Status: SHIPPED | OUTPATIENT
Start: 2023-12-12

## 2023-12-15 ENCOUNTER — PATIENT MESSAGE (OUTPATIENT)
Dept: OBGYN CLINIC | Facility: CLINIC | Age: 33
End: 2023-12-15

## 2024-01-03 ENCOUNTER — PATIENT MESSAGE (OUTPATIENT)
Dept: OBGYN CLINIC | Facility: CLINIC | Age: 34
End: 2024-01-03

## 2024-01-12 DIAGNOSIS — D25.2 INTRAMURAL AND SUBSEROUS LEIOMYOMA OF UTERUS: Primary | ICD-10-CM

## 2024-01-12 DIAGNOSIS — N80.9 ENDOMETRIOSIS: ICD-10-CM

## 2024-01-12 DIAGNOSIS — D25.1 INTRAMURAL AND SUBSEROUS LEIOMYOMA OF UTERUS: Primary | ICD-10-CM

## 2024-01-12 DIAGNOSIS — R10.2 PELVIC PAIN: ICD-10-CM

## 2024-01-15 ENCOUNTER — TELEPHONE (OUTPATIENT)
Dept: HEMATOLOGY ONCOLOGY | Facility: CLINIC | Age: 34
End: 2024-01-15

## 2024-01-15 NOTE — TELEPHONE ENCOUNTER
I called [facility] in response to a referral that was received for patient to establish care with Gynecologic Oncology.     Outreach was made to complete patient's intake questionnaire .    I left a voicemail explaining the reason for my call and advised patient to call [facility] at [phone].  Another attempt will be made to contact patient.

## 2024-01-16 ENCOUNTER — TELEPHONE (OUTPATIENT)
Dept: HEMATOLOGY ONCOLOGY | Facility: CLINIC | Age: 34
End: 2024-01-16

## 2024-01-16 NOTE — TELEPHONE ENCOUNTER
I called [facility] in response to a referral that was received for patient to establish care with Gynecologic Oncology.      Outreach was made to complete patient's intake questionnaire .     I left a voicemail explaining the reason for my call and advised patient to call [facility] at [phone].  Another attempt will be made to contact patient.

## 2024-01-17 ENCOUNTER — TELEPHONE (OUTPATIENT)
Dept: HEMATOLOGY ONCOLOGY | Facility: CLINIC | Age: 34
End: 2024-01-17

## 2024-01-17 NOTE — TELEPHONE ENCOUNTER
I called [facility] in response to a referral that was received for patient to establish care with Gynecologic Oncology.     Outreach was made to complete patient's intake questionnaire .    I left a voicemail explaining the reason for my call and advised patient to call [facility] at [phone].  This is the third attempt to schedule patient unsuccessfully. The referral has been closed, a [employer] message has been sent to patient (if applicable) and a letter has been sent to the address on file.

## 2024-01-25 ENCOUNTER — ANNUAL EXAM (OUTPATIENT)
Dept: OBGYN CLINIC | Facility: CLINIC | Age: 34
End: 2024-01-25
Payer: COMMERCIAL

## 2024-01-25 VITALS
BODY MASS INDEX: 25.6 KG/M2 | SYSTOLIC BLOOD PRESSURE: 118 MMHG | HEIGHT: 60 IN | WEIGHT: 130.4 LBS | DIASTOLIC BLOOD PRESSURE: 80 MMHG

## 2024-01-25 DIAGNOSIS — D25.2 INTRAMURAL AND SUBSEROUS LEIOMYOMA OF UTERUS: ICD-10-CM

## 2024-01-25 DIAGNOSIS — R10.2 PELVIC PAIN: ICD-10-CM

## 2024-01-25 DIAGNOSIS — Z12.4 ENCOUNTER FOR SCREENING FOR MALIGNANT NEOPLASM OF CERVIX: ICD-10-CM

## 2024-01-25 DIAGNOSIS — Z11.51 SCREENING FOR HPV (HUMAN PAPILLOMAVIRUS): ICD-10-CM

## 2024-01-25 DIAGNOSIS — Z01.419 WELL WOMAN EXAM WITH ROUTINE GYNECOLOGICAL EXAM: Primary | ICD-10-CM

## 2024-01-25 DIAGNOSIS — D25.1 INTRAMURAL AND SUBSEROUS LEIOMYOMA OF UTERUS: ICD-10-CM

## 2024-01-25 PROCEDURE — 99395 PREV VISIT EST AGE 18-39: CPT | Performed by: OBSTETRICS & GYNECOLOGY

## 2024-01-25 PROCEDURE — G0145 SCR C/V CYTO,THINLAYER,RESCR: HCPCS | Performed by: OBSTETRICS & GYNECOLOGY

## 2024-01-25 PROCEDURE — G0476 HPV COMBO ASSAY CA SCREEN: HCPCS | Performed by: OBSTETRICS & GYNECOLOGY

## 2024-01-25 NOTE — PROGRESS NOTES
ASSESSMENT & PLAN:   Diagnoses and all orders for this visit:    Well woman exam with routine gynecological exam  -     Liquid-based pap, screening  -     HPV High Risk    Encounter for screening for malignant neoplasm of cervix  -     Liquid-based pap, screening  -     HPV High Risk    Screening for HPV (human papillomavirus)  -     Liquid-based pap, screening  -     HPV High Risk    Intramural and subserous leiomyoma of uterus    Pelvic pain    We briefly discussed anticipation of surgical procedure to be done via robotic total laparoscopic hysterectomy.  Discussed that she does have a bulky fibroid uterus and may require a mini laparotomy for removal of the specimen.  I discussed the procedure in general as she will be having a surgical consultation with Dr. Yu and explained that Dr. Yu's approach will likely be explained during her surgical consultation.  Reviewed general postoperative pain control as patient does have anxiety regarding pain control after surgery.  We discussed that this is typically an outpatient surgery unless a large laparotomy is needed to perform the procedure.      The following were reviewed in today's visit: ASCCP guidelines, Gardisil vaccination, STD testing breast self exam, use and side effects of OCPs, menopause, exercise, and healthy diet.    Patient to return to office in yearly for annual exam.     All questions have been answered to her satisfaction.        CC:  Annual Gynecologic Examination  Chief Complaint   Patient presents with    Gynecologic Exam     Annual exam, pap indicated. Pt is well, would like to discuss upcoming hysterectomy, however no gyn concerns at this time.   Pap 23 wnl,  +HPV other HR, repeat in 1 year        HPI: Taylor John is a 33 y.o.  who presents for annual gynecologic examination.  She has the following concerns:    Patient has an upcoming consultation with GYN oncologist, Dr. Yu, to discuss surgical management of her large  fibroid uterus and pelvic pain.  She is currently scheduled for a robotic hysterectomy with myself in April 2024 however she has been experiencing increased pelvic pain even with sitting and at rest in the last few months.  She is hoping that Dr. Yu may be able to perform her surgical procedure sooner that she is currently scheduled with myself.  She does have questions regarding the hysterectomy procedure.    Taylor identifies as nonbinary.  She is sexually active with a female partner and is monogamous.  We did discuss that her dysphoria that may occur with ovulation and may also contribute to her overall nauseousness and pain associated with her menses may not fully resolve with surgical management.  We did discuss that her bulky uterus is likely causing bulk symptoms within her pelvis contributing to her pelvic pain as she did have a significant increase in size of her uterine fibroids over the last year.  We discussed that hormonal medication may be considered for ovulation suppression however Taylor has had poor experience with hormonal medication in the past due to increases in suicidal ideation.      Health Maintenance:    Exercise: infrequently  Breast exams/breast awareness: yes    Past Medical History:   Diagnosis Date    Allergic     I always feel allergic to something, but I haven't been tested for anything specific    Anxiety     I'm not sure that I've ever not had anxiety    Asthma     Maybe? Everyone in my family has it, and I'm curious if I do too    Depression     Probably on and off. I've never been diagnosed    Eating disorder     In high school I had disordered eating because of a lot of pain and anxiety, but it was never diagnosed as an eating disorder    Fibroid 01/01/2016    Not sure when they were found but it was sometime after 2016    Headache(784.0)     Headaches happen every once in awhile, mostly around my period    Visual impairment     I wear glasses, and have since I was 7        Past Surgical History:   Procedure Laterality Date    NO PAST SURGERIES         Past OB/Gyn History:  Period Duration (Days): 5  Period Pattern: Regular  Menstrual Flow: Heavy  Menstrual Control: Maxi pad, Thin pad, Panty linerPatient's last menstrual period was 01/06/2024 (exact date).    Last Pap: 2023 :  Normal cytology, high risk positive HPV  History of abnormal Pap smear: yes - as above  HPV vaccine completed: no    Patient is currently sexually active.   STD testing: no  Current contraception:  same sex partner      Family History  Family History   Problem Relation Age of Onset    Asthma Mother     Depression Mother         I don't know if she was every diagnosed, but she definitely has seemed depressed a lot    Learning disabilities Mother         never diagnosed, but she suspects she has dsylexia    ADD / ADHD Mother         highly suspected but not diagnosed    Anxiety disorder Mother         not diagnosed but very anxious    OCD Mother         not diagnosed but highly suspected    Anxiety disorder Father         not diagnosed but very anxious    Asthma Brother     Anxiety disorder Brother         Was treated for anxiety several years ago but is doing much better now and not on meds    Asthma Maternal Grandmother     Ovarian cancer Maternal Grandmother     Heart failure Maternal Grandmother     Rashes / Skin problems Maternal Grandmother     Stroke Maternal Grandmother     Cancer Maternal Grandmother         ovarian cancer    Anxiety disorder Maternal Grandmother     Cancer Paternal Grandfather        Family history of uterine or ovarian cancer: no  Family history of breast cancer: no  Family history of colon cancer: no    Social History:  Social History     Socioeconomic History    Marital status:      Spouse name: Not on file    Number of children: Not on file    Years of education: Not on file    Highest education level: Not on file   Occupational History    Not on file   Tobacco Use     Smoking status: Never    Smokeless tobacco: Never   Vaping Use    Vaping status: Never Used   Substance and Sexual Activity    Alcohol use: Never    Drug use: Never    Sexual activity: Not Currently     Partners: Female, Male     Birth control/protection: Abstinence, Condom Male     Comment: male partner in the past, but not currently, and never again   Other Topics Concern    Not on file   Social History Narrative    Not on file     Social Determinants of Health     Financial Resource Strain: Not on file   Food Insecurity: Not on file   Transportation Needs: Not on file   Physical Activity: Not on file   Stress: Not on file   Social Connections: Not on file   Intimate Partner Violence: Not on file   Housing Stability: Not on file     Domestic violence screen: negative    Allergies:  Allergies   Allergen Reactions    Albuterol Palpitations    Gluten Meal - Food Allergy Abdominal Pain, Anxiety, Dizziness, Fatigue, GI Intolerance and Headache       Medications:    Current Outpatient Medications:     cetirizine (ZyrTEC) 10 mg tablet, Take 10 mg by mouth daily, Disp: , Rfl:     levalbuterol (XOPENEX HFA) 45 mcg/act inhaler, Inhale 1-2 puffs every 8 (eight) hours as needed for wheezing, Disp: 15 g, Rfl: 5    amphetamine-dextroamphetamine (ADDERALL XR, 5MG,) 5 MG 24 hr capsule, Take 1 capsule (5 mg total) by mouth every morning Max Daily Amount: 5 mg (Patient not taking: Reported on 1/25/2024), Disp: 30 capsule, Rfl: 0    methylPREDNISolone 4 MG tablet therapy pack, Use as directed on package (Patient not taking: Reported on 10/11/2023), Disp: 21 each, Rfl: 0    Triamcinolone Acetonide (Nasacort Allergy 24HR) 55 MCG/ACT nasal spray, by Each Nare route daily (Patient not taking: Reported on 1/25/2024), Disp: , Rfl:     Review of Systems:  Review of Systems   Constitutional:  Negative for activity change, appetite change and unexpected weight change.   Respiratory:  Negative for cough and shortness of breath.     Cardiovascular:  Negative for chest pain.   Gastrointestinal:  Positive for abdominal pain and rectal pain. Negative for constipation, diarrhea, nausea and vomiting.   Genitourinary:  Positive for menstrual problem and pelvic pain. Negative for difficulty urinating, dyspareunia, frequency, urgency, vaginal bleeding, vaginal discharge and vaginal pain.   Musculoskeletal:  Positive for back pain.   Skin: Negative.    Neurological:  Negative for dizziness, weakness, light-headedness and headaches.   Psychiatric/Behavioral: Negative.           Physical Exam:  /80 (BP Location: Left arm, Patient Position: Sitting, Cuff Size: Standard)   Ht 5' (1.524 m)   Wt 59.1 kg (130 lb 6.4 oz)   LMP 01/06/2024 (Exact Date)   BMI 25.47 kg/m²    Physical Exam  Constitutional:       General: She is not in acute distress.     Appearance: Normal appearance. She is well-developed and normal weight. She is not diaphoretic.   Genitourinary:      Vulva and bladder normal.      No lesions in the vagina.      Genitourinary Comments: Uterus is anteverted in position and large and bulky.  It is firm to palpation secondary to large fundal fibroid that measures approximately 8 cm in diameter on ultrasound.  Palpation of this fibroid does cause pain for the patient.      Right Labia: No rash, tenderness or lesions.     Left Labia: No tenderness, lesions or rash.     No inguinal adenopathy present in the right or left side.     No vaginal discharge, erythema, tenderness or bleeding.      No vaginal prolapse present.     No vaginal atrophy present.       Right Adnexa: not tender, not full and no mass present.     Left Adnexa: not tender, not full and no mass present.     Cervix is nulliparous.      No cervical motion tenderness, discharge, friability, lesion or polyp.      No parametrium nodularity or thickening present.     Uterus is enlarged (measuring about 12 weeks), tender and irregular.      Uterine mass present.     Uterus exam  comments: Fundal fibroid is easily palpated abdominally on bimanual exam.  Diameter measures approximately 8 to 9 cm and fibroid is hard to palpation and tender to palpation for patient.  Uterus is anteverted in position with fullness palpated to the right and posterior to the cervix likely secondary to bulky fibroid uterus.  Uterus is mobile.      Uterus is anteverted.      No urethral prolapse or mass present.      Bladder is not tender.       Pelvic exam was performed with patient in the lithotomy position.   Rectum:      No tenderness or external hemorrhoid.   Breasts:     Breasts are symmetrical.      Right: No swelling, bleeding, mass, skin change or tenderness.      Left: No swelling, bleeding, mass, skin change or tenderness.   HENT:      Head: Normocephalic and atraumatic.   Neck:      Thyroid: No thyromegaly or thyroid tenderness.   Cardiovascular:      Rate and Rhythm: Normal rate and regular rhythm.      Heart sounds: Normal heart sounds. No murmur heard.     No friction rub.   Pulmonary:      Effort: Pulmonary effort is normal. No respiratory distress.      Breath sounds: Normal breath sounds. No wheezing or rales.   Abdominal:      Palpations: Abdomen is soft. There is no mass.      Tenderness: There is no abdominal tenderness. There is no guarding.   Musculoskeletal:         General: No tenderness. Normal range of motion.      Right lower leg: No edema.      Left lower leg: No edema.   Lymphadenopathy:      Lower Body: No right inguinal adenopathy. No left inguinal adenopathy.   Neurological:      Mental Status: She is alert and oriented to person, place, and time.   Skin:     General: Skin is warm and dry.      Coloration: Skin is not pale.      Findings: No erythema.   Psychiatric:         Mood and Affect: Mood normal.         Behavior: Behavior normal.         Thought Content: Thought content normal.         Judgment: Judgment normal.   Vitals and nursing note reviewed.

## 2024-01-26 LAB
HPV HR 12 DNA CVX QL NAA+PROBE: NEGATIVE
HPV16 DNA CVX QL NAA+PROBE: NEGATIVE
HPV18 DNA CVX QL NAA+PROBE: NEGATIVE

## 2024-01-31 LAB
LAB AP GYN PRIMARY INTERPRETATION: NORMAL
Lab: NORMAL

## 2024-02-01 PROBLEM — D25.9 FIBROID UTERUS: Status: ACTIVE | Noted: 2024-02-01

## 2024-02-01 NOTE — PROGRESS NOTES
Assessment/Plan:    Problem List Items Addressed This Visit       Autism - Primary     Pt very aware of her diagnosis and limitations. She reports that any deviation from routine can set her off. She feels most comfortable with understanding all plans/steps. We addressed concerns for change in plans to ex-lap and my concern for needing admission. I have recommended she prep for an admission by packing a bag that would allow her comfort to alleviate that fear. We have discussed driving to the [facility] campus, parking and walking the hallways to help her plan. She has also requested to speak with anesthesia pre operatively for which a message was sent.             Fibroid uterus     32yo with pelvic pain and known fibroid uterus presents for consultation     Exam c/w 20wk uterus with large anterior and posterior fibroid. It is mobile however I am slightly concerned about size and ability to perform robotically. She will most likely need a mini-lap for extraction at the least. We discussed alternatives to surgery including medication, and UAE. We discussed aborting the procedure if unable to perform robotically given patients anxiety regarding change in plan. However, given the less effectiveness of other alternatives and the likelihood that her symptoms will persist without treatment, she would like to proceed.     We discussed the differences between laparotomy and laparoscopic methods (i.e. robotic assisted laparoscopy). We discussed the advantage of a laparoscopic method in terms of length of hospitalization and overall recovery being reduced by a factor of 2 or 3 and the reduced amount of post-operative incision pain. We also discussed the advantage of a robotic assisted approach versus traditional laparoscopy in terms of the level of optical and manual control of instrumentation. She understands that the risks of major complications are approximately 5% and include but are not limited to hemorrhage requiring  transfusion, intestinal/urinary tract injury, wound healing impairment, infection, thrombo-embolic complications, cardio-pulmonary and renal complications.   She also understands the possibility of conversion to laparotomy for issues related to safety or findings suggesting more advanced disease than expected where the surgery could not be technically completed laparoscopically and that from a technical standpoint the risk of conversion would be somewhat greater than average given uterine size.   The patient desires to proceed with the robotic assisted approach.    Consents signed  PATs            Other Visit Diagnoses       Pelvic pain        Endometriosis                I have spent a total time of 75 minutes on 02/02/24 in caring for this patient including Diagnostic results, Risks and benefits of tx options, extensive Instructions for management, and Obtaining or reviewing history  .      CHIEF COMPLAINT: pelvic pain    Oncology Problem:   Cancer Staging   No matching staging information was found for the patient.      Previous therapy:  Oncology History    No history exists.       Most recent imaging:  US pelvis complete w transvaginal  Narrative: PELVIC ULTRASOUND, COMPLETE    INDICATION:  The patient is 32 years old.  R10.2: Pelvic and perineal pain  N80.9: Endometriosis, unspecified  Z84.2: Family history of other diseases of the genitourinary system.    COMPARISON: None    TECHNIQUE:   Transabdominal pelvic ultrasound was performed in sagittal and transverse planes with a curvilinear transducer.  Additional transvaginal imaging was performed to better evaluate the endometrium and ovaries.  Imaging included volumetric   sweeps as well as traditional still imaging technique.    FINDINGS:    UTERUS:  The uterus is anteverted in position, measuring 10.7 x 4.1 x 6.8 cm.  Rounded well-defined nodule(s) present, likely representing myoma(s), as follows: Right fundus 8.9 x 6.4 x 7.1 cm; uterine fundus 8.0 x 6.9 x  7.9 cm; mid uterus 2.0 x 4.8 x 4.9 cm.  The cervix appears within normal limits.    ENDOMETRIUM:  The endometrial echo complex has an AP caliber of 7.0 mm.  Suspected endometrial polyp measuring up to 1.0 cm.    OVARIES/ADNEXA:  Right ovary:  3.4 x 3.0 x 2.6 cm. 14.1 mL.  Left ovary:  3.1 x 3.2 x 1.8 cm. 9.1 mL.  Ovarian Doppler flow is within normal limits.  No suspicious ovarian or adnexal abnormality.    OTHER:  No free fluid or loculated fluid collections.  Impression: 1. Fibroid uterus.  2. Suspected 10 mm endometrial polyp may be further evaluated with direct visualization.  3. Unremarkable ovaries.    Workstation performed: [id number]        Patient ID: Taylor John is a 33 y.o. adult  34yo with pelvic pain and known fibroid uterus presents for consultation. Pt has been working with gyn closely and was consented for hysterectomy but reports her pain has worsened and would like a sooner date. She reports worsening pain almost daily with increased urinary frequency and difficulty laying on stomach. Last ultrasound showed 10cm uterus with 9cm and 5cm fibroids. Pt has tried medications in the past and had suicidal ideation with OCPs. Her periods are regular (>30daysbetween) but heavy. Her grandmother has h/o ovarian cancer         Review of Systems   Constitutional: Negative.    HENT: Negative.     Eyes: Negative.    Respiratory: Negative.     Cardiovascular: Negative.    Gastrointestinal:  Positive for abdominal distention and abdominal pain.   Endocrine: Negative.    Genitourinary:  Positive for menstrual problem and pelvic pain.   Musculoskeletal: Negative.    Neurological: Negative.    Psychiatric/Behavioral:  Positive for behavioral problems and sleep disturbance. The patient is nervous/anxious.        Current Outpatient Medications   Medication Sig Dispense Refill    amphetamine-dextroamphetamine (ADDERALL XR, 5MG,) 5 MG 24 hr capsule Take 1 capsule (5 mg total) by mouth every morning Max Daily Amount: 5 mg  (Patient not taking: Reported on 2024) 30 capsule 0    cetirizine (ZyrTEC) 10 mg tablet Take 10 mg by mouth daily      levalbuterol (XOPENEX HFA) 45 mcg/act inhaler Inhale 1-2 puffs every 8 (eight) hours as needed for wheezing 15 g 5    methylPREDNISolone 4 MG tablet therapy pack Use as directed on package (Patient not taking: Reported on 10/11/2023) 21 each 0    Triamcinolone Acetonide (Nasacort Allergy 24HR) 55 MCG/ACT nasal spray by Each Nare route daily (Patient not taking: Reported on 2024)       No current facility-administered medications for this visit.       Allergies   Allergen Reactions    Albuterol Palpitations    Gluten Meal - Food Allergy Abdominal Pain, Anxiety, Dizziness, Fatigue, GI Intolerance and Headache       Past Medical History:   Diagnosis Date    Allergic     I always feel allergic to something, but I haven't been tested for anything specific    Anxiety     I'm not sure that I've ever not had anxiety    Asthma     Maybe? Everyone in my family has it, and I'm curious if I do too    Depression     Probably on and off. I've never been diagnosed    Eating disorder     In high school I had disordered eating because of a lot of pain and anxiety, but it was never diagnosed as an eating disorder    Fibroid 2016    Not sure when they were found but it was sometime after 2016    Headache(784.0)     Headaches happen every once in awhile, mostly around my period    Visual impairment     I wear glasses, and have since I was 7       Past Surgical History:   Procedure Laterality Date    NO PAST SURGERIES         OB History          0    Para   0    Term   0       0    AB   0    Living   0         SAB   0    IAB   0    Ectopic   0    Multiple   0    Live Births   0           Obstetric Comments   Menarche 9                Family History   Problem Relation Age of Onset    Asthma Mother     Depression Mother         I don't know if she was every diagnosed, but she definitely has  seemed depressed a lot    Learning disabilities Mother         never diagnosed, but she suspects she has dsylexia    ADD / ADHD Mother         highly suspected but not diagnosed    Anxiety disorder Mother         not diagnosed but very anxious    OCD Mother         not diagnosed but highly suspected    Anxiety disorder Father         not diagnosed but very anxious    Asthma Brother     Anxiety disorder Brother         Was treated for anxiety several years ago but is doing much better now and not on meds    Asthma Maternal Grandmother     Ovarian cancer Maternal Grandmother     Heart failure Maternal Grandmother     Rashes / Skin problems Maternal Grandmother     Stroke Maternal Grandmother     Cancer Maternal Grandmother         ovarian cancer    Anxiety disorder Maternal Grandmother     Cancer Paternal Grandfather        The following portions of the patient's history were reviewed and updated as appropriate: allergies, current medications, past family history, past medical history, past social history, past surgical history, and problem list.      Objective:    Last menstrual period 01/06/2024.  There is no height or weight on file to calculate BMI.    Physical Exam  HENT:      Head: Normocephalic and atraumatic.   Cardiovascular:      Rate and Rhythm: Normal rate and regular rhythm.   Pulmonary:      Effort: Pulmonary effort is normal.   Abdominal:      General: There is no distension.      Palpations: Abdomen is soft. There is no mass.   Genitourinary:     Comments: The external female genitalia is normal. The bartholin's, uretheral and skenes glands are normal. The urethral meatus is normal (midline with no lesions). Anus without fissure or lesion. Speculum exam reveals vagina without lesion or discharge. Cervix is normal appearing without visible lesions deviated posteriorly. No bleeding. No significant cystocele or rectocele noted. Bimanual exam notes a large goblular uterus that is mobile, extending to  "umbilicus.   Musculoskeletal:         General: Normal range of motion.      Cervical back: Normal range of motion.   Skin:     General: Skin is warm and dry.   Neurological:      Mental Status: She is alert and oriented to person, place, and time.           No results found for: \"\"  Lab Results   Component Value Date    WBC 5.1 05/10/2023    HGB 14.1 05/10/2023    HCT 41.3 05/10/2023    MCV 87.9 05/10/2023     05/10/2023     Lab Results   Component Value Date    K 3.7 05/10/2023     05/10/2023    CO2 25 05/10/2023    BUN 8 05/10/2023    CREATININE 0.81 05/10/2023    CALCIUM 9.3 05/10/2023    AST 19 05/10/2023    ALT 12 05/10/2023    ALKPHOS 62 05/10/2023    EGFR 98 05/10/2023        Trend:  No results found for: \"\"  "

## 2024-02-02 ENCOUNTER — OFFICE VISIT (OUTPATIENT)
Dept: GYNECOLOGIC ONCOLOGY | Facility: CLINIC | Age: 34
End: 2024-02-02
Payer: COMMERCIAL

## 2024-02-02 VITALS
SYSTOLIC BLOOD PRESSURE: 120 MMHG | BODY MASS INDEX: 26.11 KG/M2 | WEIGHT: 133 LBS | DIASTOLIC BLOOD PRESSURE: 58 MMHG | HEIGHT: 60 IN | OXYGEN SATURATION: 98 % | HEART RATE: 112 BPM

## 2024-02-02 DIAGNOSIS — N80.9 ENDOMETRIOSIS: ICD-10-CM

## 2024-02-02 DIAGNOSIS — D25.1 INTRAMURAL AND SUBSEROUS LEIOMYOMA OF UTERUS: Primary | ICD-10-CM

## 2024-02-02 DIAGNOSIS — F84.0 AUTISM: ICD-10-CM

## 2024-02-02 DIAGNOSIS — R10.2 PELVIC PAIN: ICD-10-CM

## 2024-02-02 DIAGNOSIS — D25.2 INTRAMURAL AND SUBSEROUS LEIOMYOMA OF UTERUS: Primary | ICD-10-CM

## 2024-02-02 PROCEDURE — 99245 OFF/OP CONSLTJ NEW/EST HI 55: CPT | Performed by: OBSTETRICS & GYNECOLOGY

## 2024-02-02 RX ORDER — HEPARIN SODIUM 5000 [USP'U]/ML
5000 INJECTION, SOLUTION INTRAVENOUS; SUBCUTANEOUS
OUTPATIENT
Start: 2024-02-03 | End: 2024-02-04

## 2024-02-02 RX ORDER — GABAPENTIN 100 MG/1
200 CAPSULE ORAL ONCE
OUTPATIENT
Start: 2024-02-02 | End: 2024-02-02

## 2024-02-02 RX ORDER — ACETAMINOPHEN 325 MG/1
975 TABLET ORAL ONCE
OUTPATIENT
Start: 2024-02-02 | End: 2024-02-02

## 2024-02-02 NOTE — ASSESSMENT & PLAN NOTE
Pt very aware of her diagnosis and limitations. She reports that any deviation from routine can set her off. She feels most comfortable with understanding all plans/steps. We addressed concerns for change in plans to ex-lap and my concern for needing admission. I have recommended she prep for an admission by packing a bag that would allow her comfort to alleviate that fear. We have discussed driving to the [facility] campus, parking and walking the hallways to help her plan. She has also requested to speak with anesthesia pre operatively for which a message was sent.

## 2024-02-02 NOTE — ASSESSMENT & PLAN NOTE
32yo with pelvic pain and known fibroid uterus presents for consultation     Exam c/w 20wk uterus with large anterior and posterior fibroid. It is mobile however I am slightly concerned about size and ability to perform robotically. She will most likely need a mini-lap for extraction at the least. We discussed alternatives to surgery including medication, and UAE. We discussed aborting the procedure if unable to perform robotically given patients anxiety regarding change in plan. However, given the less effectiveness of other alternatives and the likelihood that her symptoms will persist without treatment, she would like to proceed.     We discussed the differences between laparotomy and laparoscopic methods (i.e. robotic assisted laparoscopy). We discussed the advantage of a laparoscopic method in terms of length of hospitalization and overall recovery being reduced by a factor of 2 or 3 and the reduced amount of post-operative incision pain. We also discussed the advantage of a robotic assisted approach versus traditional laparoscopy in terms of the level of optical and manual control of instrumentation. She understands that the risks of major complications are approximately 5% and include but are not limited to hemorrhage requiring transfusion, intestinal/urinary tract injury, wound healing impairment, infection, thrombo-embolic complications, cardio-pulmonary and renal complications.   She also understands the possibility of conversion to laparotomy for issues related to safety or findings suggesting more advanced disease than expected where the surgery could not be technically completed laparoscopically and that from a technical standpoint the risk of conversion would be somewhat greater than average given uterine size.   The patient desires to proceed with the robotic assisted approach.    Consents signed  PATs

## 2024-02-23 ENCOUNTER — OFFICE VISIT (OUTPATIENT)
Dept: FAMILY MEDICINE CLINIC | Facility: CLINIC | Age: 34
End: 2024-02-23
Payer: COMMERCIAL

## 2024-02-23 VITALS
HEART RATE: 89 BPM | TEMPERATURE: 98.1 F | BODY MASS INDEX: 25.52 KG/M2 | WEIGHT: 130 LBS | OXYGEN SATURATION: 99 % | HEIGHT: 60 IN | SYSTOLIC BLOOD PRESSURE: 118 MMHG | DIASTOLIC BLOOD PRESSURE: 72 MMHG

## 2024-02-23 DIAGNOSIS — R05.3 PERSISTENT COUGH: Primary | ICD-10-CM

## 2024-02-23 DIAGNOSIS — R04.0 RECURRENT EPISTAXIS: ICD-10-CM

## 2024-02-23 DIAGNOSIS — R09.82 PND (POST-NASAL DRIP): ICD-10-CM

## 2024-02-23 DIAGNOSIS — J45.998 SEASONAL ASTHMA: ICD-10-CM

## 2024-02-23 DIAGNOSIS — J34.89 RHINORRHEA: ICD-10-CM

## 2024-02-23 PROCEDURE — 99214 OFFICE O/P EST MOD 30 MIN: CPT | Performed by: FAMILY MEDICINE

## 2024-02-23 RX ORDER — FAMOTIDINE 20 MG/1
20 TABLET, FILM COATED ORAL 2 TIMES DAILY
Qty: 60 TABLET | Refills: 0 | Status: SHIPPED | OUTPATIENT
Start: 2024-02-23 | End: 2025-02-17

## 2024-02-23 RX ORDER — MONTELUKAST SODIUM 10 MG/1
10 TABLET ORAL
Qty: 30 TABLET | Refills: 0 | Status: SHIPPED | OUTPATIENT
Start: 2024-02-23

## 2024-02-23 NOTE — PROGRESS NOTES
Name: Taylor John      : 1990      MRN: [id number]  Encounter Provider: Mando Chase MD  Encounter Date: 2024   Encounter department: [facility]    Assessment & Plan     1. Persistent cough  -     famotidine (PEPCID) 20 mg tablet; Take 1 tablet (20 mg total) by mouth 2 (two) times a day  -     Ambulatory Referral to Otolaryngology; Future  -     montelukast (SINGULAIR) 10 mg tablet; Take 1 tablet (10 mg total) by mouth daily at bedtime  -     Ambulatory Referral to Allergy; Future  -     Spacer Device for Inhaler    2. Recurrent epistaxis  -     Ambulatory Referral to Otolaryngology; Future  -     Ambulatory Referral to Allergy; Future    3. Rhinorrhea  -     Ambulatory Referral to Allergy; Future    4. PND (post-nasal drip)  -     Ambulatory Referral to Allergy; Future    5. Seasonal asthma  -     Spacer Device for Inhaler      Unclear etiology regarding patient's persistent cough, differential diagnosis include postnasal drip drip/irritation from nasal polyp  Setting of recurrent epistaxis will refer patient to ENT for evaluation and treatment  Patient may experience some hypersensitivity pneumonitis as she does have cough triggered from candles and strong scents  Will start patient on singular for treatment  Order spacer for patient's Xopenex  Refer to allergist for evaluation  There may be a component of acid reflux as well due to the timing of patient's symptoms, will try 2 weeks of famotidine to see if there is any significant improvement, patient's prior use of antacid with Tums causes significant discomfort, this medication should not cause similar symptoms         Subjective     HPI    33-year-old patient presents to discuss chronic cough.  Patient reports she had similar issue for about 2 years after her COVID diagnosis.  Most recently she was evaluated in the office in September for similar issue and was prescribed medication for treatment.  She noticed that her cough  "seems to be worse in the morning and worse after dinner around 8 PM.  She does wake up \"violently\" due to cough on times.  She uses leave albuterol as she cannot tolerate regular albuterol inhaler which caused her to be significantly jittery.  Has tried medication such as Xyzal and Nasacort without significant improvement in her symptoms.  Patient feels like there is a \"squeaky toy\" in the middle of her chest.    Denies any smoke exposure, does not have any scented candles.  Denies any significant acid reflux symptoms.  Patient has tried antacid medication in the past which did not agree with her stomach.    Review of Systems   Constitutional:  Negative for chills, fatigue and fever.   HENT:  Positive for nosebleeds, postnasal drip and rhinorrhea. Negative for congestion and sore throat.    Respiratory:  Positive for cough and chest tightness. Negative for shortness of breath.    Cardiovascular:  Negative for chest pain.   Gastrointestinal:  Negative for abdominal pain.   Neurological:  Negative for dizziness, light-headedness and headaches.   Psychiatric/Behavioral:  Negative for sleep disturbance.        Past Medical History:   Diagnosis Date    Allergic     I always feel allergic to something, but I haven't been tested for anything specific    Anxiety     I'm not sure that I've ever not had anxiety    Asthma     Maybe? Everyone in my family has it, and I'm curious if I do too    Depression     Probably on and off. I've never been diagnosed    Eating disorder     In high school I had disordered eating because of a lot of pain and anxiety, but it was never diagnosed as an eating disorder    Fibroid 01/01/2016    Not sure when they were found but it was sometime after 2016    Headache(784.0)     Headaches happen every once in awhile, mostly around my period    Visual impairment     I wear glasses, and have since I was 7     Past Surgical History:   Procedure Laterality Date    NO PAST SURGERIES       Family History "   Problem Relation Age of Onset    Asthma Mother     Depression Mother         I don't know if she was every diagnosed, but she definitely has seemed depressed a lot    Learning disabilities Mother         never diagnosed, but she suspects she has dsylexia    ADD / ADHD Mother         highly suspected but not diagnosed    Anxiety disorder Mother         not diagnosed but very anxious    OCD Mother         not diagnosed but highly suspected    Anxiety disorder Father         not diagnosed but very anxious    Asthma Brother     Anxiety disorder Brother         Was treated for anxiety several years ago but is doing much better now and not on meds    Asthma Maternal Grandmother     Ovarian cancer Maternal Grandmother     Heart failure Maternal Grandmother     Rashes / Skin problems Maternal Grandmother     Stroke Maternal Grandmother     Cancer Maternal Grandmother         ovarian    Anxiety disorder Maternal Grandmother     Cancer Paternal Grandfather      Social History     Socioeconomic History    Marital status:      Spouse name: None    Number of children: None    Years of education: None    Highest education level: None   Occupational History    None   Tobacco Use    Smoking status: Never    Smokeless tobacco: Never   Vaping Use    Vaping status: Never Used   Substance and Sexual Activity    Alcohol use: Never    Drug use: Never    Sexual activity: Not Currently     Partners: Female, Male     Birth control/protection: Abstinence     Comment: male partner in the past, but not currently, and never again   Other Topics Concern    None   Social History Narrative    None     Social Determinants of Health     Financial Resource Strain: Not on file   Food Insecurity: Not on file   Transportation Needs: Not on file   Physical Activity: Not on file   Stress: Not on file   Social Connections: Not on file   Intimate Partner Violence: Not on file   Housing Stability: Not on file     Current Outpatient Medications on  File Prior to Visit   Medication Sig    cetirizine (ZyrTEC) 10 mg tablet Take 10 mg by mouth daily    levalbuterol (XOPENEX HFA) 45 mcg/act inhaler Inhale 1-2 puffs every 8 (eight) hours as needed for wheezing    Triamcinolone Acetonide (Nasacort Allergy 24HR) 55 MCG/ACT nasal spray by Each Nare route daily    amphetamine-dextroamphetamine (ADDERALL XR, 5MG,) 5 MG 24 hr capsule Take 1 capsule (5 mg total) by mouth every morning Max Daily Amount: 5 mg (Patient not taking: Reported on 1/25/2024)    methylPREDNISolone 4 MG tablet therapy pack Use as directed on package (Patient not taking: Reported on 10/11/2023)     Allergies   Allergen Reactions    Albuterol Palpitations    Gluten Meal - Food Allergy Abdominal Pain, Anxiety, Dizziness, Fatigue, GI Intolerance and Headache     Immunization History   Administered Date(s) Administered    COVID-19 Pfizer Vac BIVALENT Jeffry-sucrose 12 Yr+ IM 11/18/2022       Objective     /72 (BP Location: Left arm, Patient Position: Sitting, Cuff Size: Standard)   Pulse 89   Temp 98.1 °F (36.7 °C) (Temporal)   Ht 5' (1.524 m)   Wt 59 kg (130 lb)   LMP 02/08/2024 (Exact Date)   SpO2 99%   BMI 25.39 kg/m²     Physical Exam  Vitals reviewed.   Constitutional:       General: She is not in acute distress.     Appearance: Normal appearance. She is not ill-appearing, toxic-appearing or diaphoretic.   Cardiovascular:      Rate and Rhythm: Normal rate.      Pulses: Normal pulses.      Heart sounds: Normal heart sounds. No murmur heard.  Pulmonary:      Effort: Pulmonary effort is normal. No respiratory distress.      Breath sounds: Normal breath sounds.   Abdominal:      General: Abdomen is flat.   Musculoskeletal:         General: No swelling.   Skin:     General: Skin is warm and dry.      Capillary Refill: Capillary refill takes less than 2 seconds.      Coloration: Skin is not jaundiced.   Neurological:      General: No focal deficit present.      Mental Status: She is alert and  oriented to person, place, and time.   Psychiatric:         Mood and Affect: Mood normal.         Mando Chase MD

## 2024-03-06 ENCOUNTER — ANESTHESIA EVENT (OUTPATIENT)
Dept: PERIOP | Facility: HOSPITAL | Age: 34
End: 2024-03-06
Payer: COMMERCIAL

## 2024-03-08 ENCOUNTER — LAB REQUISITION (OUTPATIENT)
Dept: LAB | Facility: HOSPITAL | Age: 34
End: 2024-03-08
Payer: COMMERCIAL

## 2024-03-08 ENCOUNTER — APPOINTMENT (OUTPATIENT)
Dept: LAB | Facility: MEDICAL CENTER | Age: 34
End: 2024-03-08
Payer: COMMERCIAL

## 2024-03-08 DIAGNOSIS — D25.2 SUBSEROSAL LEIOMYOMA OF UTERUS: ICD-10-CM

## 2024-03-08 DIAGNOSIS — D25.1 INTRAMURAL AND SUBSEROUS LEIOMYOMA OF UTERUS: ICD-10-CM

## 2024-03-08 DIAGNOSIS — D25.1 INTRAMURAL LEIOMYOMA OF UTERUS: ICD-10-CM

## 2024-03-08 DIAGNOSIS — F41.9 ANXIETY: Primary | ICD-10-CM

## 2024-03-08 DIAGNOSIS — D25.2 INTRAMURAL AND SUBSEROUS LEIOMYOMA OF UTERUS: ICD-10-CM

## 2024-03-08 LAB
ABO GROUP BLD: NORMAL
ANION GAP SERPL CALCULATED.3IONS-SCNC: 9 MMOL/L
BLD GP AB SCN SERPL QL: NEGATIVE
BUN SERPL-MCNC: 8 MG/DL (ref 5–25)
CALCIUM SERPL-MCNC: 8.8 MG/DL (ref 8.4–10.2)
CHLORIDE SERPL-SCNC: 105 MMOL/L (ref 96–108)
CO2 SERPL-SCNC: 24 MMOL/L (ref 21–32)
CREAT SERPL-MCNC: 0.83 MG/DL (ref 0.6–1.3)
ERYTHROCYTE [DISTWIDTH] IN BLOOD BY AUTOMATED COUNT: 13.2 % (ref 11.6–15.1)
EST. AVERAGE GLUCOSE BLD GHB EST-MCNC: 91 MG/DL
GLUCOSE P FAST SERPL-MCNC: 89 MG/DL (ref 65–99)
HBA1C MFR BLD: 4.8 %
HCT VFR BLD AUTO: 39.3 % (ref 36.5–46.1)
HGB BLD-MCNC: 13.6 G/DL (ref 12–15.4)
MCH RBC QN AUTO: 30.2 PG (ref 26.8–34.3)
MCHC RBC AUTO-ENTMCNC: 34.6 G/DL (ref 31.4–37.4)
MCV RBC AUTO: 87 FL (ref 82–98)
PLATELET # BLD AUTO: 235 THOUSANDS/UL (ref 149–390)
PMV BLD AUTO: 11.1 FL (ref 8.9–12.7)
POTASSIUM SERPL-SCNC: 4.1 MMOL/L (ref 3.5–5.3)
RBC # BLD AUTO: 4.5 MILLION/UL (ref 3.88–5.12)
RH BLD: POSITIVE
SODIUM SERPL-SCNC: 138 MMOL/L (ref 135–147)
SPECIMEN EXPIRATION DATE: NORMAL
WBC # BLD AUTO: 5.84 THOUSAND/UL (ref 4.31–10.16)

## 2024-03-08 PROCEDURE — 80048 BASIC METABOLIC PNL TOTAL CA: CPT

## 2024-03-08 PROCEDURE — 85027 COMPLETE CBC AUTOMATED: CPT

## 2024-03-08 PROCEDURE — 36415 COLL VENOUS BLD VENIPUNCTURE: CPT

## 2024-03-08 PROCEDURE — 83036 HEMOGLOBIN GLYCOSYLATED A1C: CPT

## 2024-03-08 PROCEDURE — 86901 BLOOD TYPING SEROLOGIC RH(D): CPT | Performed by: OBSTETRICS & GYNECOLOGY

## 2024-03-08 PROCEDURE — 86900 BLOOD TYPING SEROLOGIC ABO: CPT | Performed by: OBSTETRICS & GYNECOLOGY

## 2024-03-08 PROCEDURE — 86850 RBC ANTIBODY SCREEN: CPT | Performed by: OBSTETRICS & GYNECOLOGY

## 2024-03-08 RX ORDER — LORAZEPAM 1 MG/1
1 TABLET ORAL EVERY 6 HOURS PRN
Qty: 5 TABLET | Refills: 0 | Status: SHIPPED | OUTPATIENT
Start: 2024-03-08 | End: 2024-03-08

## 2024-03-12 DIAGNOSIS — F41.9 ANXIETY: Primary | ICD-10-CM

## 2024-03-12 RX ORDER — LORAZEPAM 1 MG/1
1 TABLET ORAL EVERY 6 HOURS PRN
Qty: 5 TABLET | Refills: 0 | Status: SHIPPED | OUTPATIENT
Start: 2024-03-12

## 2024-03-13 ENCOUNTER — TELEPHONE (OUTPATIENT)
Dept: GYNECOLOGIC ONCOLOGY | Facility: CLINIC | Age: 34
End: 2024-03-13

## 2024-03-15 RX ORDER — OMEGA-3 FATTY ACIDS/FISH OIL 300-1000MG
CAPSULE ORAL
COMMUNITY

## 2024-03-15 NOTE — PRE-PROCEDURE INSTRUCTIONS
Pre-Surgery Instructions:   Medication Instructions    cetirizine (ZyrTEC) 10 mg tablet Do not take day of surgery; continue as prescribed excluding DOS     famotidine (PEPCID) 20 mg tablet Take Day of Surgery; unless usually taken at night     Ibuprofen (Advil) 200 MG CAPS Avoid 1 week prior to surgery      levalbuterol (XOPENEX HFA) 45 mcg/act inhaler Take Day of Surgery; unless usually taken at night     LORazepam (ATIVAN) 1 mg tablet Take Day of Surgery; unless usually taken at night     Triamcinolone Acetonide (Nasacort Allergy 24HR) 55 MCG/ACT nasal spray Take Day of Surgery; unless usually taken at night     Medication instructions for day surgery reviewed. Please use only a sip of water to take your instructed medications. Avoid all over the counter vitamins, supplements and NSAIDS for one week prior to surgery per anesthesia guidelines. Tylenol is ok to take as needed.     You will receive a call one business day prior to surgery with an arrival time and hospital directions. If your surgery is scheduled on a Monday, the hospital will be calling you on the Friday prior to your surgery. If you have not heard from anyone by 8pm, please call the hospital supervisor through the hospital  at [phone]. ([place] [phone] or [place] [phone]).    Do not eat or drink anything after midnight the night before your surgery, including candy, mints, lifesavers, or chewing gum. Do not drink alcohol 24hrs before your surgery. Try not to smoke at least 24hrs before your surgery.       Follow the pre surgery showering instructions as listed in the “My Surgical Experience Booklet” or otherwise provided by your surgeon's office. Do not use a blade to shave the surgical area 1 week before surgery. It is okay to use a clean electric clippers up to 24 hours before surgery. Do not apply any lotions, creams, including makeup, cologne, deodorant, or perfumes after showering on the day of your surgery. Do not use  dry shampoo, hair spray, hair gel, or any type of hair products.     No contact lenses, eye make-up, or artificial eyelashes. Remove nail polish, including gel polish, and any artificial, gel, or acrylic nails if possible. Remove all jewelry including rings and body piercing jewelry.     Wear causal clothing that is easy to take on and off. Consider your type of surgery.    Keep any valuables, jewelry, piercings at home. Please bring any specially ordered equipment (sling, braces) if indicated.    Arrange for a responsible person to drive you to and from the hospital on the day of your surgery. Please confirm the visitor policy for the day of your procedure when you receive your phone call with an arrival time.     Call the surgeon's office with any new illnesses, exposures, or additional questions prior to surgery.    Please reference your “My Surgical Experience Booklet” for additional information to prepare for your upcoming surgery.

## 2024-03-21 ENCOUNTER — HOSPITAL ENCOUNTER (OUTPATIENT)
Facility: HOSPITAL | Age: 34
Setting detail: OUTPATIENT SURGERY
Discharge: HOME | End: 2024-03-21
Attending: OBSTETRICS & GYNECOLOGY | Admitting: OBSTETRICS & GYNECOLOGY
Payer: COMMERCIAL

## 2024-03-21 ENCOUNTER — ANESTHESIA (OUTPATIENT)
Dept: PERIOP | Facility: HOSPITAL | Age: 34
End: 2024-03-21
Payer: COMMERCIAL

## 2024-03-21 VITALS
DIASTOLIC BLOOD PRESSURE: 62 MMHG | TEMPERATURE: 99.2 F | SYSTOLIC BLOOD PRESSURE: 119 MMHG | HEIGHT: 60 IN | WEIGHT: 130 LBS | BODY MASS INDEX: 25.52 KG/M2 | RESPIRATION RATE: 20 BRPM | OXYGEN SATURATION: 100 % | HEART RATE: 92 BPM

## 2024-03-21 DIAGNOSIS — D25.2 INTRAMURAL AND SUBSEROUS LEIOMYOMA OF UTERUS: ICD-10-CM

## 2024-03-21 DIAGNOSIS — R10.2 PELVIC PAIN: ICD-10-CM

## 2024-03-21 DIAGNOSIS — G89.18 POST-OP PAIN: Primary | ICD-10-CM

## 2024-03-21 DIAGNOSIS — D25.1 INTRAMURAL AND SUBSEROUS LEIOMYOMA OF UTERUS: ICD-10-CM

## 2024-03-21 PROBLEM — Z90.710 HISTORY OF ROBOT-ASSISTED LAPAROSCOPIC HYSTERECTOMY: Status: ACTIVE | Noted: 2024-03-21

## 2024-03-21 LAB
EXT PREGNANCY TEST URINE: NEGATIVE
EXT. CONTROL: NORMAL
GLUCOSE SERPL-MCNC: 128 MG/DL (ref 65–140)

## 2024-03-21 PROCEDURE — 81025 URINE PREGNANCY TEST: CPT | Performed by: OBSTETRICS & GYNECOLOGY

## 2024-03-21 PROCEDURE — 82948 REAGENT STRIP/BLOOD GLUCOSE: CPT

## 2024-03-21 PROCEDURE — 57250 REPAIR RECTUM & VAGINA: CPT | Performed by: OBSTETRICS & GYNECOLOGY

## 2024-03-21 PROCEDURE — S2900 ROBOTIC SURGICAL SYSTEM: HCPCS | Performed by: OBSTETRICS & GYNECOLOGY

## 2024-03-21 PROCEDURE — NC001 PR NO CHARGE: Performed by: PHYSICIAN ASSISTANT

## 2024-03-21 PROCEDURE — 58573 TLH W/T/O UTERUS OVER 250 G: CPT | Performed by: OBSTETRICS & GYNECOLOGY

## 2024-03-21 PROCEDURE — 88307 TISSUE EXAM BY PATHOLOGIST: CPT | Performed by: SPECIALIST

## 2024-03-21 PROCEDURE — NC001 PR NO CHARGE: Performed by: OBSTETRICS & GYNECOLOGY

## 2024-03-21 RX ORDER — GABAPENTIN 100 MG/1
200 CAPSULE ORAL ONCE
Status: DISCONTINUED | OUTPATIENT
Start: 2024-03-21 | End: 2024-03-21 | Stop reason: HOSPADM

## 2024-03-21 RX ORDER — HYDROMORPHONE HCL IN WATER/PF 6 MG/30 ML
0.2 PATIENT CONTROLLED ANALGESIA SYRINGE INTRAVENOUS
Status: DISCONTINUED | OUTPATIENT
Start: 2024-03-21 | End: 2024-03-21 | Stop reason: HOSPADM

## 2024-03-21 RX ORDER — OXYCODONE HYDROCHLORIDE 5 MG/1
5 TABLET ORAL EVERY 4 HOURS PRN
Status: CANCELLED | OUTPATIENT
Start: 2024-03-21

## 2024-03-21 RX ORDER — SCOLOPAMINE TRANSDERMAL SYSTEM 1 MG/1
PATCH, EXTENDED RELEASE TRANSDERMAL AS NEEDED
Status: DISCONTINUED | OUTPATIENT
Start: 2024-03-21 | End: 2024-03-21

## 2024-03-21 RX ORDER — SODIUM CHLORIDE 9 MG/ML
INJECTION, SOLUTION INTRAVENOUS AS NEEDED
Status: DISCONTINUED | OUTPATIENT
Start: 2024-03-21 | End: 2024-03-21 | Stop reason: HOSPADM

## 2024-03-21 RX ORDER — BUPIVACAINE HYDROCHLORIDE 5 MG/ML
INJECTION, SOLUTION EPIDURAL; INTRACAUDAL AS NEEDED
Status: DISCONTINUED | OUTPATIENT
Start: 2024-03-21 | End: 2024-03-21 | Stop reason: HOSPADM

## 2024-03-21 RX ORDER — MIDAZOLAM HYDROCHLORIDE 2 MG/2ML
INJECTION, SOLUTION INTRAMUSCULAR; INTRAVENOUS AS NEEDED
Status: DISCONTINUED | OUTPATIENT
Start: 2024-03-21 | End: 2024-03-21

## 2024-03-21 RX ORDER — CEFAZOLIN SODIUM 1 G/50ML
SOLUTION INTRAVENOUS AS NEEDED
Status: DISCONTINUED | OUTPATIENT
Start: 2024-03-21 | End: 2024-03-21

## 2024-03-21 RX ORDER — IBUPROFEN 600 MG/1
600 TABLET ORAL EVERY 6 HOURS PRN
Status: CANCELLED | OUTPATIENT
Start: 2024-03-21

## 2024-03-21 RX ORDER — FENTANYL CITRATE 50 UG/ML
INJECTION, SOLUTION INTRAMUSCULAR; INTRAVENOUS AS NEEDED
Status: DISCONTINUED | OUTPATIENT
Start: 2024-03-21 | End: 2024-03-21

## 2024-03-21 RX ORDER — SCOLOPAMINE TRANSDERMAL SYSTEM 1 MG/1
PATCH, EXTENDED RELEASE TRANSDERMAL
Status: COMPLETED
Start: 2024-03-21 | End: 2024-03-21

## 2024-03-21 RX ORDER — ONDANSETRON 2 MG/ML
INJECTION INTRAMUSCULAR; INTRAVENOUS AS NEEDED
Status: DISCONTINUED | OUTPATIENT
Start: 2024-03-21 | End: 2024-03-21

## 2024-03-21 RX ORDER — FENTANYL CITRATE/PF 50 MCG/ML
50 SYRINGE (ML) INJECTION
Status: DISCONTINUED | OUTPATIENT
Start: 2024-03-21 | End: 2024-03-21 | Stop reason: HOSPADM

## 2024-03-21 RX ORDER — LIDOCAINE HYDROCHLORIDE 10 MG/ML
INJECTION, SOLUTION EPIDURAL; INFILTRATION; INTRACAUDAL; PERINEURAL AS NEEDED
Status: DISCONTINUED | OUTPATIENT
Start: 2024-03-21 | End: 2024-03-21

## 2024-03-21 RX ORDER — HEPARIN SODIUM 5000 [USP'U]/ML
5000 INJECTION, SOLUTION INTRAVENOUS; SUBCUTANEOUS
Status: COMPLETED | OUTPATIENT
Start: 2024-03-21 | End: 2024-03-21

## 2024-03-21 RX ORDER — OXYCODONE HYDROCHLORIDE 5 MG/1
5 TABLET ORAL EVERY 4 HOURS PRN
Qty: 10 TABLET | Refills: 0 | Status: SHIPPED | OUTPATIENT
Start: 2024-03-21 | End: 2024-03-28

## 2024-03-21 RX ORDER — MAGNESIUM HYDROXIDE 1200 MG/15ML
LIQUID ORAL AS NEEDED
Status: DISCONTINUED | OUTPATIENT
Start: 2024-03-21 | End: 2024-03-21 | Stop reason: HOSPADM

## 2024-03-21 RX ORDER — ONDANSETRON 2 MG/ML
4 INJECTION INTRAMUSCULAR; INTRAVENOUS ONCE AS NEEDED
Status: DISCONTINUED | OUTPATIENT
Start: 2024-03-21 | End: 2024-03-21 | Stop reason: HOSPADM

## 2024-03-21 RX ORDER — SODIUM CHLORIDE 9 MG/ML
INJECTION, SOLUTION INTRAVENOUS CONTINUOUS PRN
Status: DISCONTINUED | OUTPATIENT
Start: 2024-03-21 | End: 2024-03-21

## 2024-03-21 RX ORDER — ACETAMINOPHEN 325 MG/1
975 TABLET ORAL ONCE
Status: COMPLETED | OUTPATIENT
Start: 2024-03-21 | End: 2024-03-21

## 2024-03-21 RX ORDER — CEFAZOLIN SODIUM 1 G/50ML
1000 SOLUTION INTRAVENOUS ONCE
Status: DISCONTINUED | OUTPATIENT
Start: 2024-03-21 | End: 2024-03-21 | Stop reason: HOSPADM

## 2024-03-21 RX ORDER — KETAMINE HYDROCHLORIDE 100 MG/ML
INJECTION, SOLUTION INTRAMUSCULAR; INTRAVENOUS AS NEEDED
Status: DISCONTINUED | OUTPATIENT
Start: 2024-03-21 | End: 2024-03-21

## 2024-03-21 RX ORDER — ACETAMINOPHEN 325 MG/1
975 TABLET ORAL EVERY 6 HOURS PRN
Status: CANCELLED | OUTPATIENT
Start: 2024-03-21

## 2024-03-21 RX ORDER — PROPOFOL 10 MG/ML
INJECTION, EMULSION INTRAVENOUS AS NEEDED
Status: DISCONTINUED | OUTPATIENT
Start: 2024-03-21 | End: 2024-03-21

## 2024-03-21 RX ORDER — SODIUM CHLORIDE, SODIUM LACTATE, POTASSIUM CHLORIDE, CALCIUM CHLORIDE 600; 310; 30; 20 MG/100ML; MG/100ML; MG/100ML; MG/100ML
100 INJECTION, SOLUTION INTRAVENOUS CONTINUOUS
Status: DISCONTINUED | OUTPATIENT
Start: 2024-03-21 | End: 2024-03-21 | Stop reason: HOSPADM

## 2024-03-21 RX ORDER — ALBUMIN, HUMAN INJ 5% 5 %
SOLUTION INTRAVENOUS CONTINUOUS PRN
Status: DISCONTINUED | OUTPATIENT
Start: 2024-03-21 | End: 2024-03-21

## 2024-03-21 RX ORDER — ROCURONIUM BROMIDE 10 MG/ML
INJECTION, SOLUTION INTRAVENOUS AS NEEDED
Status: DISCONTINUED | OUTPATIENT
Start: 2024-03-21 | End: 2024-03-21

## 2024-03-21 RX ORDER — KETOROLAC TROMETHAMINE 30 MG/ML
15 INJECTION, SOLUTION INTRAMUSCULAR; INTRAVENOUS ONCE
Status: COMPLETED | OUTPATIENT
Start: 2024-03-21 | End: 2024-03-21

## 2024-03-21 RX ORDER — DEXAMETHASONE SODIUM PHOSPHATE 10 MG/ML
INJECTION, SOLUTION INTRAMUSCULAR; INTRAVENOUS AS NEEDED
Status: DISCONTINUED | OUTPATIENT
Start: 2024-03-21 | End: 2024-03-21

## 2024-03-21 RX ORDER — SODIUM CHLORIDE, SODIUM LACTATE, POTASSIUM CHLORIDE, CALCIUM CHLORIDE 600; 310; 30; 20 MG/100ML; MG/100ML; MG/100ML; MG/100ML
INJECTION, SOLUTION INTRAVENOUS CONTINUOUS PRN
Status: DISCONTINUED | OUTPATIENT
Start: 2024-03-21 | End: 2024-03-21

## 2024-03-21 RX ORDER — KETOROLAC TROMETHAMINE 30 MG/ML
INJECTION, SOLUTION INTRAMUSCULAR; INTRAVENOUS
Status: COMPLETED
Start: 2024-03-21 | End: 2024-03-21

## 2024-03-21 RX ADMIN — DEXAMETHASONE SODIUM PHOSPHATE 10 MG: 10 INJECTION, SOLUTION INTRAMUSCULAR; INTRAVENOUS at 10:02

## 2024-03-21 RX ADMIN — SUGAMMADEX 100 MG: 100 INJECTION, SOLUTION INTRAVENOUS at 13:01

## 2024-03-21 RX ADMIN — KETAMINE HYDROCHLORIDE 10 MG: 100 INJECTION INTRAMUSCULAR; INTRAVENOUS at 11:45

## 2024-03-21 RX ADMIN — SCOPALAMINE 1 PATCH: 1 PATCH, EXTENDED RELEASE TRANSDERMAL at 09:55

## 2024-03-21 RX ADMIN — ACETAMINOPHEN 975 MG: 325 TABLET, FILM COATED ORAL at 09:25

## 2024-03-21 RX ADMIN — ROCURONIUM BROMIDE 10 MG: 10 INJECTION, SOLUTION INTRAVENOUS at 12:32

## 2024-03-21 RX ADMIN — HEPARIN SODIUM 5000 UNITS: 5000 INJECTION INTRAVENOUS; SUBCUTANEOUS at 09:26

## 2024-03-21 RX ADMIN — PHENYLEPHRINE HYDROCHLORIDE 30 MCG/MIN: 50 INJECTION INTRAVENOUS at 10:19

## 2024-03-21 RX ADMIN — ROCURONIUM BROMIDE 40 MG: 10 INJECTION, SOLUTION INTRAVENOUS at 10:04

## 2024-03-21 RX ADMIN — KETOROLAC TROMETHAMINE 15 MG: 30 INJECTION, SOLUTION INTRAMUSCULAR; INTRAVENOUS at 15:19

## 2024-03-21 RX ADMIN — DEXMEDETOMIDINE HYDROCHLORIDE 4 MCG: 100 INJECTION, SOLUTION INTRAVENOUS at 11:44

## 2024-03-21 RX ADMIN — LIDOCAINE HYDROCHLORIDE 50 MG: 10 INJECTION, SOLUTION EPIDURAL; INFILTRATION; INTRACAUDAL; PERINEURAL at 10:02

## 2024-03-21 RX ADMIN — MIDAZOLAM 2 MG: 1 INJECTION INTRAMUSCULAR; INTRAVENOUS at 09:54

## 2024-03-21 RX ADMIN — KETAMINE HYDROCHLORIDE 10 MG: 100 INJECTION INTRAMUSCULAR; INTRAVENOUS at 12:33

## 2024-03-21 RX ADMIN — SODIUM CHLORIDE, SODIUM LACTATE, POTASSIUM CHLORIDE, AND CALCIUM CHLORIDE: .6; .31; .03; .02 INJECTION, SOLUTION INTRAVENOUS at 09:55

## 2024-03-21 RX ADMIN — FENTANYL CITRATE 50 MCG: 50 INJECTION INTRAMUSCULAR; INTRAVENOUS at 13:54

## 2024-03-21 RX ADMIN — PROPOFOL 120 MCG/KG/MIN: 10 INJECTION, EMULSION INTRAVENOUS at 10:03

## 2024-03-21 RX ADMIN — ROCURONIUM BROMIDE 10 MG: 10 INJECTION, SOLUTION INTRAVENOUS at 11:08

## 2024-03-21 RX ADMIN — ROCURONIUM BROMIDE 10 MG: 10 INJECTION, SOLUTION INTRAVENOUS at 10:52

## 2024-03-21 RX ADMIN — ONDANSETRON 4 MG: 2 INJECTION INTRAMUSCULAR; INTRAVENOUS at 10:10

## 2024-03-21 RX ADMIN — MIDAZOLAM 2 MG: 1 INJECTION INTRAMUSCULAR; INTRAVENOUS at 09:49

## 2024-03-21 RX ADMIN — DEXMEDETOMIDINE HYDROCHLORIDE 4 MCG: 100 INJECTION, SOLUTION INTRAVENOUS at 12:22

## 2024-03-21 RX ADMIN — KETAMINE HYDROCHLORIDE 20 MG: 100 INJECTION INTRAMUSCULAR; INTRAVENOUS at 10:05

## 2024-03-21 RX ADMIN — CEFAZOLIN SODIUM 1000 MG: 1 SOLUTION INTRAVENOUS at 10:10

## 2024-03-21 RX ADMIN — PROPOFOL 160 MG: 10 INJECTION, EMULSION INTRAVENOUS at 10:02

## 2024-03-21 RX ADMIN — KETAMINE HYDROCHLORIDE 10 MG: 100 INJECTION INTRAMUSCULAR; INTRAVENOUS at 10:52

## 2024-03-21 RX ADMIN — SODIUM CHLORIDE: 0.9 INJECTION, SOLUTION INTRAVENOUS at 10:08

## 2024-03-21 RX ADMIN — FENTANYL CITRATE 50 MCG: 50 INJECTION INTRAMUSCULAR; INTRAVENOUS at 10:05

## 2024-03-21 RX ADMIN — FENTANYL CITRATE 50 MCG: 50 INJECTION INTRAMUSCULAR; INTRAVENOUS at 10:02

## 2024-03-21 RX ADMIN — DEXMEDETOMIDINE HYDROCHLORIDE 12 MCG: 100 INJECTION, SOLUTION INTRAVENOUS at 10:23

## 2024-03-21 RX ADMIN — SUGAMMADEX 200 MG: 100 INJECTION, SOLUTION INTRAVENOUS at 12:52

## 2024-03-21 RX ADMIN — ALBUMIN (HUMAN): 12.5 INJECTION, SOLUTION INTRAVENOUS at 10:22

## 2024-03-21 NOTE — DISCHARGE INSTRUCTIONS
Hysterectomy   AMBULATORY CARE:   What you need to know about a hysterectomy:  A hysterectomy is surgery to remove your uterus. Your ovaries, fallopian tubes, cervix, or part of your vagina may also need to be removed. The organs and tissue that will be removed depends on your medical condition.  How to prepare for a hysterectomy:  Your healthcare provider will talk to you about how to prepare for surgery. You will need to stop taking aspirin 7 to 10 days before your procedure. You will need to stop taking NSAIDs 3 days before you procedure. You will also need to stop taking certain herbal supplements 7 days before your procedure. These include garlic, gingko biloba, and ginseng. Your provider may tell you to shower the night before your surgery. He or she may tell you to use a certain soap to help prevent a surgical site infection. Your provider may tell you not to eat or drink anything after midnight on the day of your surgery. He or she will tell you what medicines to take or not take on the day of your surgery. You will be given an antibiotic through your IV to help prevent a bacterial infection. Arrange for someone to drive you home and stay with you after surgery.   What will happen during a hysterectomy:   You may be given general anesthesia to keep you asleep and free from pain during surgery. You may instead be given regional anesthesia to numb the lower part of your body. Your uterus may be removed through your vagina (vaginal hysterectomy) or through a an incision in your abdomen (abdominal hysterectomy). It may also be done through several small incisions in your abdomen (laparoscopic hysterectomy). During a laparoscopic hysterectomy, a laparoscope and other tools will be put into your abdomen through the small incisions. The laparoscope is a long metal tube with a light and camera on the end. Your abdomen will be filled with a gas. This allows your surgeon to see inside your abdomen more clearly.      Your surgeon will cut and tie the ligaments that hold your uterus in place. The blood vessels that go to your uterus will also be tied to help decrease bleeding. Your surgeon may also remove other organs or tissue such as your ovaries, fallopian tubes, cervix, lymph nodes, or part of your vagina.     Your surgeon may instead use robotic arms to place a laparoscope and other tools inside your abdomen through the incisions. He or she will use the machine to look inside your abdomen and guide the robotic arms. Your surgeon will use the tools attached to the robotic arms to remove your uterus, cervix, or other tissues.    Any incisions that were made during surgery will be closed with stitches, staples, surgical glue, or surgical tape. The incisions may be covered with a bandage. A vaginal pack or sanitary pad may be used to absorb the bleeding. A vaginal pack is a special gauze that is inserted into the vagina. It is removed before you go home or to a hospital room.    What will happen after a hysterectomy:  You may have a catheter to help drain your bladder for up to 24 hours. You may also have pain in your shoulders or near your ribs if gas was put in your abdomen. You will have pain for the first few days after surgery. You will need to wear sanitary pads for vaginal bleeding that occurs after surgery. You will be asked to walk as soon as possible after surgery. This will help to prevent blood clots in your legs. You may need to stay in the hospital for 1 to 4 days after surgery. The length of time depends on the type of hysterectomy you had.   Risks of a hysterectomy:   The surgeon may need to change the type of surgery he or she was planning to do. For example, your surgeon may need to change from a laparoscopic surgery to an open abdominal surgery. You will not be able to become pregnant after you have a hysterectomy. You will go through menopause if your ovaries are removed.     You may bleed more than  expected or get an infection. Your bladder, ureters, or bowels may be damaged during surgery. If your ureters were injured, you may need a catheter to drain your bladder for several days to weeks. You may get scar tissue in your abdomen that blocks your intestine or causes pelvic pain. If you have a hysterectomy to treat cancer, this surgery may not take it away completely. There is also a chance that the cancer may return. You may get a blood clot in your leg or arm. This may become life-threatening.    Call 911 for any of the following:   You feel lightheaded, short of breath, and have chest pain.    You cough up blood.    Seek care immediately:   Your arm or leg feels warm, tender, and painful. It may look swollen and red.    You have increasing abdominal or pelvic pain.    Contact your healthcare provider or gynecologist if:   You have heavy vaginal bleeding that fills 1 or more sanitary pads in 1 hour.    You have a fever.    You have nausea or are vomiting.     You feel pain or burning when you urinate, or you have trouble urinating.    You have pus or a foul-smelling odor coming from your vagina.     Your wound is red, swollen, or draining pus.    You feel pressure in your rectum.     You have questions or concerns about your condition or care.    Medicines:   Prescription pain medicine  may be given. Ask your healthcare provider how to take this medicine safely.    Stool softeners  help treat or prevent constipation.     Take your medicine as directed.  Contact your healthcare provider if you think your medicine is not helping or if you have side effects. Tell your provider if you are allergic to any medicine. Keep a list of the medicines, vitamins, and herbs you take. Include the amounts, and when and why you take them. Bring the list or the pill bottles to follow-up visits. Carry your medicine list with you in case of an emergency.    Activity:   Wear an abdominal binder as directed. An abdominal binder  will decrease pain when you move or cough.     Rest as needed. Get up and move around as directed to help prevent blood clots. Start with short walks and slowly increase the distance every day. Limit the number of times you climb stairs to 2 times each day. Plan most of your daily activities on one level of your home.     Do not lift objects heavier than 10 pounds for 6 weeks. Avoid strenuous activity for 2 weeks.     Do not strain during bowel movements. High-fiber foods and extra liquids can help you prevent constipation. Examples of high-fiber foods are fruit and bran. Prune juice and water are good liquids to drink.     Do not have sex, use tampons, or douche for up to 8 weeks. Ask your healthcare provider if it is okay to take a tub bath.     Do not go in pools or hot tubs for 6 weeks or as directed.     Ask when it is safe for you to drive, return to work, and return to other regular activities.    Wound care:  If you have abdominal incisions, care for them as directed. Carefully wash around the wound with soap and water. It is okay to let the soap and water run over your incision. Do not  scrub your incision. Dry the area and put on new, clean bandages as directed. Change your bandages when they get wet or dirty. If you have strips of medical tape, let them fall off on their own. It may take 7 to 14 days for them to fall off. Check your incision every day for redness, swelling, or pus.   Deep breathing:  Take deep breaths and cough 10 times each hour. This will decrease your risk for a lung infection. Take a deep breath and hold it for as long as you can. Let the air out and then cough strongly. Deep breaths help open your airway. You may be given an incentive spirometer to help you take deep breaths. Put the plastic piece in your mouth and take a slow, deep breath, then let the air out and cough. Repeat these steps 10 times every hour.   Get support:  This surgery may be life-changing for you and your  family. You will no longer be able to get pregnant. Sudden changes in the levels of your hormones may occur and cause mood swings and depression. You may feel angry, sad, or frightened, or cry frequently and unexpectedly. These feelings are normal. Talk to your healthcare provider about where you can get support. You can also ask if hormone replacement medicine is right for you.   Follow up with your healthcare provider or gynecologist as directed:  You may need to return to have stitches removed, and for other tests. Write down your questions so you remember to ask them during your visits.  © Copyright [employer] 2023 Information is for End User's use only and may not be sold, redistributed or otherwise used for commercial purposes.  The above information is an  only. It is not intended as medical advice for individual conditions or treatments. Talk to your doctor, nurse or pharmacist before following any medical regimen to see if it is safe and effective for you.

## 2024-03-21 NOTE — OP NOTE
OPERATIVE REPORT  PATIENT NAME: Taylor John    :  1990  MRN: [id number]  Pt Location: AN OR ROOM 04    SURGERY DATE: 3/21/2024    Surgeons and Role:     * Stella Yu MD - Primary     * Aneta Gan PA-C - Assisting     * Magalis Whatley MD - Assisting     * Melia Greene MD - Assisting    Preop Diagnosis:  Pelvic pain [R10.2]  Intramural and subserous leiomyoma of uterus [D25.1, D25.2]    Post-Op Diagnosis Codes:     * Pelvic pain [R10.2]     * Intramural and subserous leiomyoma of uterus [D25.1, D25.2]    Procedure(s):  EXAM UNDER ANESTHESIA (EUA)  MODIFIED RADICAL HYSTERECTOMY LAPAROSCOPIC TOTAL (LTH) W/ ROBOTICS. BILATERAL SALPINGECTOMY. UTERUS >250G. COMPLETE URETEROLYSIS BILATERALLY  REPAIR LACERATION PERINEAL / VAGINAL 3CM PERIURETHRAL. 3CM POSTERIOR FORCHETTE 2ND DEGREE    Specimen(s):  ID Type Source Tests Collected by Time Destination   1 : UTERUS, CERVIX, BILATERAL FALLOPIAN TUBES Tissue Uterus TISSUE EXAM Stella Yu MD 3/21/2024 11:59 AM        Estimated Blood Loss:   Minimal    Drains:  Urethral Catheter Non-latex 16 Fr. (Active)   Number of days: 0       Anesthesia Type:   General    Operative Indications:  Pelvic pain [R10.2]  Intramural and subserous leiomyoma of uterus [D25.1, D25.2]  The patient is a 33 y.o. year-old with a history of fibroid uterus.  The results of her diagnostic testing, her differential diagnosis and treatment options, and the benefits and risks of these were reviewed. She opted to proceed with surgical management.    Operative Findings:  Normal bowel, omentum, liver, gallbladder.  Normal appearing upper abdomen. 20c size uterus with right fundal fibroid and posterior fibroid extending to umbilicus. Normal bilateral fallopian tubes and ovaries. Inherent vaginal laceration secondary to specimen removal.     Complications:   None    Procedure and Technique:  The patient was brought to the Operating Room where general anesthesia was induced. The abdomen,  vagina and perineum were prepped and draped. Atraumatic vaginal retractors were placed to identify the cervix and complete the examination. The cervix was grasped with a single tooth tenaculum. The cervix was easily dilated and the uterus sounded to 10 cm. A medium Koh ring was placed around the cervix.  Then an 10 cm CLIFTON cannula was placed in the uterine cavity without difficulty and the vaginal balloon inflated. A Mitchell catheter was placed in a sterile fashion.    A point incision was made in the umbilicus, and with the abdomen under anterior traction with two katelyn-umbilical towel clamps, a Veress needle was inserted into the abdominal cavity with the carbon dioxide on low flow. Immediate pressure drop to 0 mm and diffuse abdominal distention indicated intraperitoneal placement. The peritoneal cavity was then insufflated with carbon dioxide on high flow to maintain a pressure of 10 mm Hg throughout the case.  An incision was made 25 cm above the pubic symphysis, through which the robotic trocar was introduced into the abdominal cavity. The laparoscope was then inserted and the peritoneal cavity explored with the above findings. There was no evidence of visceral injury. Additional robotic ports and a 8mm assistant port were placed under direct visualization.  With the patient in steep Trendelenburg, the robot was docked to the robotic ports and the instruments were placed under direct visualization.    With the uterus on upward traction, the peritoneum was incised in planes lateral and parallel to the ovarian vessels on both sides.  The ureters were then identified retroperitoneally.  The fallopian tubes were divided along the mesosalpinx to the level of the cornua.  The utero-ovarian ligament and vessels were coagulated proximally using the synchroseal and divided using cautery. The round ligaments were isolated, cauterized and cut. The posterior peritoneum was dropped to the level of the koh ring by elevating  the fibroid and identifying the ureter and uterine vessels at their origin. Ureter was noted to be elevated on the right over the fibroid and this was carefully dissected down to identify the tunnel of wertheim and the uterine vessels were cauterized and divided above this. The vesico-uterine peritoneum was incised, and the bladder dissected from the cervix and upper vagina in a meticulous fashion to the level of the koh ring. On the left the uterine vessels were then skeletonized coagulated at the level of the koh ring using bipolar current, then divided. On the right, uterine vessels were elevated and further dissected away from the ureter to identify the koh ring. With the bladder mobilized anteriorally and the rectum well away posteriorally, using the monopolar device, an incision was made in the anterior upper vagina at the level of the cervical-vaginal junction.  The incision was continued circumferentially around the upper vagina, controlling upper vaginal blood supply laterally using the biopolar. This allowed completely amputation of the specimen. The vaginal balloon was removed. The large fibroid was transected and palced into an endocatch bag. The remaining uterus and fibroid was placed into second endocatch bag. These were then brought out the vagina. Large fibroid was partially morcellated contained for removal. Second specimen removed intact.     Ovary on the right was noted to lay within the paravesicle space and decisio nwas made to transfix the ovary to the psoas to avoid containment in this area. Small deserosalized area on the bladder oversewn with 3-0 vicryl.     The upper vagina was then closed laparoscopically with 2-0 stratafix, taking care to avoid bladder injury. The upper vagina was intact and hemostatic. The vaginal balloon was removed.     The robotic instruments were removed, and the robot undocked. The laparoscopic skin incisions were irrigated and made hemostatic using  electrocoagulation. They were closed with subcuticular stitches of 4-0 monocryl.     Vaginal exam noted 3-4cm left periurtheral laceration extending to the clitoris. This was repaired in 2 layers. 2-3cm posterior second degree laceration was repaired with good reappoximation of the ligaments.     All sponge, needle and instrument counts were correct x2.  Anesthesia was reversed and the patient was taken to the PACU in a stable condition.    I was present for the entire procedure. and A physician assistant was required during the procedure for retraction, tissue handling, dissection and suturing.    Patient Disposition:  PACU     SIGNATURE: Stella Yu MD  DATE: March 21, 2024  TIME: 12:39 PM

## 2024-03-21 NOTE — ANESTHESIA POSTPROCEDURE EVALUATION
Post-Op Assessment Note    CV Status:  Stable    Pain management: adequate       Mental Status:  Sleepy   Hydration Status:  Euvolemic   PONV Controlled:  Controlled   Airway Patency:  Patent  Two or more mitigation strategies used for obstructive sleep apnea   Post Op Vitals Reviewed: Yes    No anethesia notable event occurred.    Staff: Anesthesiologist, CRNA               BP      Temp      Pulse     Resp      SpO2

## 2024-03-21 NOTE — H&P
H&P Exam - Gynecologic Oncology  Taylor John 33 y.o. adult MRN: [id number]  Unit/Bed#: OR POOL Encounter: [id number]    Assessment/Plan     To OR for procedure    HPI:  Taylor John is a 33 y.o. adult who presents for RA-TLH, BS for large fibroid uterus. She has a history of autism and is aware that there is a chance that the procedure may not be completed robotically and may need to be converted to an open procedure. Please see note from 24 for full details. Patient has no complaints at this time.    Accepts blood products if indicated: yes  Accepts all life-saving measures including intubation & resuscitation with compressions: yes    Review of Systems   Constitutional:  Negative for chills.   Eyes:  Negative for visual disturbance.   Respiratory:  Negative for chest tightness and shortness of breath.    Cardiovascular:  Negative for chest pain and palpitations.   Gastrointestinal:  Negative for abdominal pain.   Genitourinary:  Negative for vaginal bleeding, vaginal discharge and vaginal pain.       Historical Information   Past Medical History:   Diagnosis Date    Allergic     I always feel allergic to something, but I haven't been tested for anything specific    Anxiety     I'm not sure that I've ever not had anxiety    Asthma     Maybe? Everyone in my family has it, and I'm curious if I do too    Depression     Probably on and off. I've never been diagnosed    Eating disorder     In high school I had disordered eating because of a lot of pain and anxiety, but it was never diagnosed as an eating disorder    Fibroid 2016    Not sure when they were found but it was sometime after     Headache(784.0)     Headaches happen every once in awhile, mostly around my period    Motion sickness     PONV (postoperative nausea and vomiting)     Visual impairment     I wear glasses, and have since I was 7     Past Surgical History:   Procedure Laterality Date    EGD       OB History    Para Term   AB Living   0 0 0 0 0 0   SAB IAB Ectopic Multiple Live Births   0 0 0 0 0   Obstetric Comments   Menarche 9    Birth Control History: Birth Control Pill     Family History   Problem Relation Age of Onset    Asthma Mother     Depression Mother         I don't know if she was every diagnosed, but she definitely has seemed depressed a lot    Learning disabilities Mother         never diagnosed, but she suspects she has dsylexia    ADD / ADHD Mother         highly suspected but not diagnosed    Anxiety disorder Mother         not diagnosed but very anxious    OCD Mother         not diagnosed but highly suspected    Anxiety disorder Father         not diagnosed but very anxious    Asthma Brother     Anxiety disorder Brother         Was treated for anxiety several years ago but is doing much better now and not on meds    Asthma Maternal Grandmother     Ovarian cancer Maternal Grandmother     Heart failure Maternal Grandmother     Rashes / Skin problems Maternal Grandmother     Stroke Maternal Grandmother     Cancer Maternal Grandmother         ovarian    Anxiety disorder Maternal Grandmother     Cancer Paternal Grandfather      Social History   Social History     Substance and Sexual Activity   Alcohol Use Never     Social History     Substance and Sexual Activity   Drug Use Never     Social History     Tobacco Use   Smoking Status Never   Smokeless Tobacco Never       Meds/Allergies   Medications Prior to Admission   Medication    cetirizine (ZyrTEC) 10 mg tablet    famotidine (PEPCID) 20 mg tablet    levalbuterol (XOPENEX HFA) 45 mcg/act inhaler    LORazepam (ATIVAN) 1 mg tablet    Triamcinolone Acetonide (Nasacort Allergy 24HR) 55 MCG/ACT nasal spray    amphetamine-dextroamphetamine (ADDERALL XR, 5MG,) 5 MG 24 hr capsule    Ibuprofen (Advil) 200 MG CAPS    montelukast (SINGULAIR) 10 mg tablet     Allergies   Allergen Reactions    Eggs Or Egg-Derived Products - Food Allergy Throat Swelling    Albuterol Palpitations     Gluten Meal - Food Allergy Abdominal Pain, Anxiety, Dizziness, Fatigue, GI Intolerance and Headache       Objective     Vitals:  /76   Pulse (!) 108   Temp 100.2 °F (37.9 °C) (Temporal)   Resp 18   Ht 5' (1.524 m)   Wt 59 kg (130 lb)   LMP 03/14/2024   SpO2 100%   BMI 25.39 kg/m²     No intake/output data recorded.  No intake/output data recorded.    Lab Results   Component Value Date    WBC 5.84 03/08/2024    HGB 13.6 03/08/2024    HCT 39.3 03/08/2024    MCV 87 03/08/2024     03/08/2024       Lab Results   Component Value Date    CALCIUM 8.8 03/08/2024    K 4.1 03/08/2024    CO2 24 03/08/2024     03/08/2024    BUN 8 03/08/2024    CREATININE 0.83 03/08/2024       Physical Exam  HENT:      Head: Normocephalic.   Eyes:      Conjunctiva/sclera: Conjunctivae normal.   Cardiovascular:      Rate and Rhythm: Normal rate.      Pulses: Normal pulses.   Pulmonary:      Effort: Pulmonary effort is normal.   Abdominal:      Palpations: Abdomen is soft.      Tenderness: There is no abdominal tenderness.   Skin:     General: Skin is warm.   Neurological:      Mental Status: She is alert and oriented to person, place, and time.   Psychiatric:         Mood and Affect: Mood normal.         Imaging: I have personally reviewed pertinent reports.      EKG, Pathology, and Other Studies: I have personally reviewed pertinent reports.      Code Status: No Order    Melia Greene MD  3/21/2024  9:25 AM

## 2024-03-21 NOTE — ANESTHESIA PREPROCEDURE EVALUATION
Procedure:  EXAM UNDER ANESTHESIA (EUA) (Vagina )  HYSTERECTOMY LAPAROSCOPIC TOTAL (LTH) W/ ROBOTICS, BILATERAL SALPINGECTOMY AND ALL OTHER INDICATED PROCEDURES (Abdomen)  LAPAROTOMY EXPLORATORY W/ ROBOTICS (Abdomen)    Relevant Problems   PULMONARY   (+) Seasonal asthma        Physical Exam    Airway    Mallampati score: II  TM Distance: >3 FB  Neck ROM: full     Dental   No notable dental hx     Cardiovascular  Rhythm: regular, Rate: normal, No weak pulses    Pulmonary   No stridor    Other Findings  post-pubertal.      Anesthesia Plan  ASA Score- 2     Anesthesia Type- general with ASA Monitors.         Additional Monitors:     Airway Plan: ETT.           Plan Factors-    Chart reviewed. EKG reviewed.  Existing labs reviewed. Patient summary reviewed.                  Induction- intravenous.    Postoperative Plan- Plan for postoperative opioid use. Planned trial extubation    Informed Consent- Anesthetic plan and risks discussed with patient.  I personally reviewed this patient with the CRNA. Discussed and agreed on the Anesthesia Plan with the CRNA..

## 2024-03-25 DIAGNOSIS — F41.9 ANXIETY: ICD-10-CM

## 2024-03-25 DIAGNOSIS — M62.838 MUSCLE SPASM: Primary | ICD-10-CM

## 2024-03-25 DIAGNOSIS — R35.0 URINARY FREQUENCY: Primary | ICD-10-CM

## 2024-03-25 RX ORDER — LORAZEPAM 1 MG/1
1 TABLET ORAL EVERY 6 HOURS PRN
Qty: 8 TABLET | Refills: 0 | Status: SHIPPED | OUTPATIENT
Start: 2024-03-25

## 2024-03-25 RX ORDER — CYCLOBENZAPRINE HCL 10 MG
10 TABLET ORAL 3 TIMES DAILY PRN
Qty: 5 TABLET | Refills: 0 | Status: SHIPPED | OUTPATIENT
Start: 2024-03-25

## 2024-03-25 RX ORDER — NALOXONE HYDROCHLORIDE 4 MG/.1ML
SPRAY NASAL
Qty: 1 EACH | Refills: 1 | Status: SHIPPED | OUTPATIENT
Start: 2024-03-25 | End: 2025-03-25

## 2024-03-26 PROCEDURE — 88307 TISSUE EXAM BY PATHOLOGIST: CPT | Performed by: SPECIALIST

## 2024-03-28 ENCOUNTER — OFFICE VISIT (OUTPATIENT)
Dept: GYNECOLOGIC ONCOLOGY | Facility: CLINIC | Age: 34
End: 2024-03-28

## 2024-03-28 VITALS
SYSTOLIC BLOOD PRESSURE: 142 MMHG | DIASTOLIC BLOOD PRESSURE: 80 MMHG | HEART RATE: 114 BPM | OXYGEN SATURATION: 100 % | BODY MASS INDEX: 24.8 KG/M2 | WEIGHT: 127 LBS | TEMPERATURE: 98.8 F

## 2024-03-28 DIAGNOSIS — Z09 POSTOP CHECK: Primary | ICD-10-CM

## 2024-03-28 DIAGNOSIS — R30.0 DYSURIA: ICD-10-CM

## 2024-03-28 PROBLEM — D25.9 FIBROID UTERUS: Status: RESOLVED | Noted: 2024-02-01 | Resolved: 2024-03-28

## 2024-03-28 PROCEDURE — 99024 POSTOP FOLLOW-UP VISIT: CPT | Performed by: NURSE PRACTITIONER

## 2024-03-28 NOTE — ASSESSMENT & PLAN NOTE
33-year-old who is s/p TLH/BS on 3/21/24 for fibroid uterus. Pathology benign. Her procedure was complicated by an incidental 3m periurethral laceration, which was repaired. Patient has been feeling well overall. She has healthcare-related anxiety, and presents for evaluation of her vulvar repair site, as she noted bruising. Otherwise, her pain is minimal and well-maintained with ibuprofen and Tylenol.    On exam, laceration is well-approximated. There is no drainage or bleeding. Sutures are all intact. There is mild bruising of the left labium, with minimal swelling. Her abdominal trocar sites are all intact and without evidence of infection.    Patient will maintain pelvic rest until 6 weeks postop. Her next postop appointment with Dr. Yu was scheduled for 4/22. She will call in the interim with any new concerns.

## 2024-03-28 NOTE — PROGRESS NOTES
Assessment/Plan:    Problem List Items Addressed This Visit          Urinary    Dysuria     Very likely related to location of laceration/repair. For her peace of mind, I've entered a UA should she decide that she would like to r/o UTI.          Relevant Orders    UA w Reflex to Microscopic w Reflex to Culture       Orthopedic/Musculoskeletal    Postop check - Primary     33-year-old who is s/p TLH/BS on 3/21/24 for fibroid uterus. Pathology benign. Her procedure was complicated by an incidental 3m periurethral laceration, which was repaired. Patient has been feeling well overall. She has healthcare-related anxiety, and presents for evaluation of her vulvar repair site, as she noted bruising. Otherwise, her pain is minimal and well-maintained with ibuprofen and Tylenol.    On exam, laceration is well-approximated. There is no drainage or bleeding. Sutures are all intact. There is mild bruising of the left labium, with minimal swelling. Her abdominal trocar sites are all intact and without evidence of infection.    Patient will maintain pelvic rest until 6 weeks postop. Her next postop appointment with Dr. Yu was scheduled for 4/22. She will call in the interim with any new concerns.              CHIEF COMPLAINT: Postop eval       Problem:  Cancer Staging   No matching staging information was found for the patient.        Previous therapy:  Oncology History    No history exists.         Patient ID: Taylor John is a 33 y.o. adult    This is a 33 y.o. adult who presents to the office for post-operative evaluation. She is recovering uneventfully from surgery. Notes that she looked at her vulva in the mirror a day or two ago and was unsettled by the bruising she saw. She has some katelyn-urethral irritation and wants to confirm this is normal. She has been afebrile. She denies nausea or vomiting. Her appetite is appropriate. She reports normal bowel and bladder function. Sometimes, she feels as if she is not fully  emptying her bladder when voiding. She denies vaginal bleeding or discharge. She has mild abdominal tenderness as expected in the immediate postoperative period, mainly on the right side. She is ambulatory.          The following portions of the patient's history were reviewed and updated as appropriate: allergies, current medications, past family history, past medical history, past social history, past surgical history, and problem list.    Review of Systems   Constitutional:  Negative for chills, fatigue, fever and unexpected weight change.   HENT:  Negative for nosebleeds.    Eyes: Negative.    Respiratory:  Negative for cough, chest tightness, shortness of breath and wheezing.    Cardiovascular:  Negative for chest pain, palpitations and leg swelling.   Gastrointestinal:  Negative for abdominal distention, abdominal pain, anal bleeding, blood in stool, constipation, diarrhea, nausea, rectal pain and vomiting.   Endocrine: Negative.    Genitourinary:  Positive for dysuria. Negative for difficulty urinating, frequency, hematuria, pelvic pain, urgency, vaginal bleeding, vaginal discharge and vaginal pain.   Musculoskeletal:  Negative for arthralgias and joint swelling.   Skin:  Negative for color change, pallor and rash.   Neurological:  Negative for dizziness, weakness, light-headedness, numbness and headaches.   Hematological: Negative.    Psychiatric/Behavioral: Negative.           Current Outpatient Medications:     cetirizine (ZyrTEC) 10 mg tablet, Take 10 mg by mouth daily, Disp: , Rfl:     cyclobenzaprine (FLEXERIL) 10 mg tablet, Take 1 tablet (10 mg total) by mouth 3 (three) times a day as needed for muscle spasms, Disp: 5 tablet, Rfl: 0    famotidine (PEPCID) 20 mg tablet, Take 1 tablet (20 mg total) by mouth 2 (two) times a day, Disp: 60 tablet, Rfl: 0    Ibuprofen (Advil) 200 MG CAPS, Take by mouth, Disp: , Rfl:     levalbuterol (XOPENEX HFA) 45 mcg/act inhaler, Inhale 1-2 puffs every 8 (eight) hours as  needed for wheezing, Disp: 15 g, Rfl: 5    LORazepam (ATIVAN) 1 mg tablet, Take 1 tablet (1 mg total) by mouth every 6 (six) hours as needed for anxiety, Disp: 8 tablet, Rfl: 0    naloxone (NARCAN) 4 mg/0.1 mL nasal spray, Administer 1 spray into a nostril. If no response after 2-3 minutes, give another dose in the other nostril using a new spray., Disp: 1 each, Rfl: 1    Triamcinolone Acetonide (Nasacort Allergy 24HR) 55 MCG/ACT nasal spray, by Each Nare route daily, Disp: , Rfl:     amphetamine-dextroamphetamine (ADDERALL XR, 5MG,) 5 MG 24 hr capsule, Take 1 capsule (5 mg total) by mouth every morning Max Daily Amount: 5 mg (Patient not taking: Reported on 1/25/2024), Disp: 30 capsule, Rfl: 0    Objective:    Blood pressure 142/80, pulse (!) 114, temperature 98.8 °F (37.1 °C), temperature source Temporal, weight 57.6 kg (127 lb), last menstrual period 03/14/2024, SpO2 100%.  Body mass index is 24.8 kg/m².  Body surface area is 1.54 meters squared.    Physical Exam  Vitals reviewed.   Constitutional:       General: She is not in acute distress.     Appearance: Normal appearance.   HENT:      Head: Normocephalic and atraumatic.      Mouth/Throat:      Mouth: Mucous membranes are moist.   Pulmonary:      Effort: Pulmonary effort is normal.      Breath sounds: Normal breath sounds.   Abdominal:      Palpations: Abdomen is soft. There is no mass.      Tenderness: There is no abdominal tenderness.   Genitourinary:     Comments: Vulva with mild bruising on the left labium. No significant swelling. Laceration well-approximated, with sutures intact.  Skin:     General: Skin is warm and dry.      Comments: Surgical trocar sites are intact, clean and dry without induration, erythema or purulent drainage.    Neurological:      Mental Status: She is alert and oriented to person, place, and time.   Psychiatric:         Mood and Affect: Mood normal.         Behavior: Behavior normal.         Thought Content: Thought content  normal.         Judgment: Judgment normal.         Pathology:  Case Report   Surgical Pathology Report                         Case: [id number]                                   Authorizing Provider:  Stella Yu MD           Collected:           03/21/2024 1459               Ordering Location:     [facility]        Received:            03/21/2024 [address] [place] [address] Operating Room                                                       Pathologist:           Sil Ocampo MD                                                     Specimen:    Uterus, UTERUS, CERVIX, BILATERAL FALLOPIAN TUBES                                          Final Diagnosis   A. Uterus, cervix, bilateral fallopian tubes:  - Disordered proliferative endometrium, negative for atypia or carcinoma.    - Leiomyomas.   - Serosa with no significant histopathologic abnormality recognized.  - Benign ecto and endocervix with chronic inflammation.   - Benign fallopian tubes.    Electronically signed by Sil Ocampo MD on 3/26/2024 at 11:32 AM

## 2024-03-28 NOTE — ASSESSMENT & PLAN NOTE
Very likely related to location of laceration/repair. For her peace of mind, I've entered a UA should she decide that she would like to r/o UTI.

## 2024-03-28 NOTE — PATIENT INSTRUCTIONS
1. Return to the office in 4 weeks for next postop visit.  2. Contact the office in the interim if you develop any any new or concerning symptoms, including vaginal bleeding, discharge, abdominal or pelvic pain, etc.

## 2024-04-03 ENCOUNTER — OFFICE VISIT (OUTPATIENT)
Dept: FAMILY MEDICINE CLINIC | Facility: CLINIC | Age: 34
End: 2024-04-03
Payer: COMMERCIAL

## 2024-04-03 VITALS
BODY MASS INDEX: 24.66 KG/M2 | TEMPERATURE: 98 F | DIASTOLIC BLOOD PRESSURE: 80 MMHG | SYSTOLIC BLOOD PRESSURE: 122 MMHG | HEIGHT: 60 IN | WEIGHT: 125.6 LBS | OXYGEN SATURATION: 98 % | HEART RATE: 89 BPM

## 2024-04-03 DIAGNOSIS — F41.9 ANXIETY: ICD-10-CM

## 2024-04-03 DIAGNOSIS — R30.0 DYSURIA: ICD-10-CM

## 2024-04-03 DIAGNOSIS — R05.3 PERSISTENT COUGH: Primary | ICD-10-CM

## 2024-04-03 LAB
SL AMB  POCT GLUCOSE, UA: NORMAL
SL AMB LEUKOCYTE ESTERASE,UA: 125
SL AMB POCT BILIRUBIN,UA: NORMAL
SL AMB POCT BLOOD,UA: NORMAL
SL AMB POCT CLARITY,UA: CLEAR
SL AMB POCT COLOR,UA: NORMAL
SL AMB POCT KETONES,UA: NORMAL
SL AMB POCT NITRITE,UA: NORMAL
SL AMB POCT PH,UA: 6
SL AMB POCT SPECIFIC GRAVITY,UA: 1.01
SL AMB POCT URINE PROTEIN: NORMAL
SL AMB POCT UROBILINOGEN: 0.2

## 2024-04-03 PROCEDURE — 81003 URINALYSIS AUTO W/O SCOPE: CPT | Performed by: FAMILY MEDICINE

## 2024-04-03 PROCEDURE — 87086 URINE CULTURE/COLONY COUNT: CPT | Performed by: FAMILY MEDICINE

## 2024-04-03 PROCEDURE — 99214 OFFICE O/P EST MOD 30 MIN: CPT | Performed by: FAMILY MEDICINE

## 2024-04-03 RX ORDER — FAMOTIDINE 20 MG/1
20 TABLET, FILM COATED ORAL 2 TIMES DAILY
Qty: 180 TABLET | Refills: 1 | Status: SHIPPED | OUTPATIENT
Start: 2024-04-03 | End: 2025-03-29

## 2024-04-03 RX ORDER — SERTRALINE HYDROCHLORIDE 25 MG/1
25 TABLET, FILM COATED ORAL DAILY
Qty: 30 TABLET | Refills: 1 | Status: SHIPPED | OUTPATIENT
Start: 2024-04-03 | End: 2024-09-30

## 2024-04-03 NOTE — PROGRESS NOTES
Assessment/Plan:    1. Persistent cough  -     famotidine (PEPCID) 20 mg tablet; Take 1 tablet (20 mg total) by mouth 2 (two) times a day    2. Dysuria  -     POCT urine dip auto non-scope  -     Urine culture; Future  -     Urine culture    3. Anxiety  -     sertraline (ZOLOFT) 25 mg tablet; Take 1 tablet (25 mg total) by mouth daily      TCM Call       None          TCM Call       None             There are no Patient Instructions on file for this visit.    Return in about 4 weeks (around 5/1/2024).    Subjective:      Patient ID: Taylor John is a 33 y.o. adult.    Chief Complaint   Patient presents with    Follow-up     hysterectomy       Here for TCM. H/o significant dysmenorrhea, pelvic pain, heavy menses followed by nausea and weakness. Known fibroid at least 5-6 years ago, that grew to grapefruit size. Hysterectomy was planned, but date was moved up due to worsening of symptoms and the progression of the mass. Pt did not desire children. Cervix removed, but ovaries intact. Surgery was 2 weeks ago. S/p LAVH. There was some tearing. BM back to normal schedule. Had some trouble voiding after surgery, weakness of stream. 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 Not much bleeding post op. BP controlled. Anxiety improves with ativan but doesn't want to be on regularly. Asks for daily anxiety med.         The following portions of the patient's history were reviewed and updated as appropriate: allergies, current medications, past family history, past medical history, past social history, past surgical history and problem list.    Review of Systems   Constitutional:  Negative for fatigue and fever.   HENT:  Negative for congestion.    Eyes:  Negative for visual disturbance.   Respiratory:  Negative for chest tightness and shortness of breath.    Cardiovascular:  Negative for chest pain and palpitations.   Gastrointestinal:  Negative for abdominal pain.    Genitourinary:  Positive for dysuria, frequency and urgency. Negative for difficulty urinating and hematuria.   Musculoskeletal:  Negative for arthralgias.   Neurological:  Negative for headaches.   Hematological:  Does not bruise/bleed easily.   Psychiatric/Behavioral:  The patient is nervous/anxious.          Current Outpatient Medications   Medication Sig Dispense Refill    cetirizine (ZyrTEC) 10 mg tablet Take 10 mg by mouth daily      famotidine (PEPCID) 20 mg tablet Take 1 tablet (20 mg total) by mouth 2 (two) times a day 180 tablet 1    Ibuprofen (Advil) 200 MG CAPS Take by mouth      levalbuterol (XOPENEX HFA) 45 mcg/act inhaler Inhale 1-2 puffs every 8 (eight) hours as needed for wheezing 15 g 5    LORazepam (ATIVAN) 1 mg tablet Take 1 tablet (1 mg total) by mouth every 6 (six) hours as needed for anxiety 8 tablet 0    sertraline (ZOLOFT) 25 mg tablet Take 1 tablet (25 mg total) by mouth daily 30 tablet 1    Triamcinolone Acetonide (Nasacort Allergy 24HR) 55 MCG/ACT nasal spray by Each Nare route daily      amphetamine-dextroamphetamine (ADDERALL XR, 5MG,) 5 MG 24 hr capsule Take 1 capsule (5 mg total) by mouth every morning Max Daily Amount: 5 mg (Patient not taking: Reported on 1/25/2024) 30 capsule 0    cyclobenzaprine (FLEXERIL) 10 mg tablet Take 1 tablet (10 mg total) by mouth 3 (three) times a day as needed for muscle spasms (Patient not taking: Reported on 4/3/2024) 5 tablet 0    naloxone (NARCAN) 4 mg/0.1 mL nasal spray Administer 1 spray into a nostril. If no response after 2-3 minutes, give another dose in the other nostril using a new spray. 1 each 1     No current facility-administered medications for this visit.       Objective:    /80 (BP Location: Left arm, Patient Position: Sitting, Cuff Size: Standard)   Pulse 89   Temp 98 °F (36.7 °C) (Temporal)   Ht 5' (1.524 m)   Wt 57 kg (125 lb 9.6 oz)   LMP 03/14/2024   SpO2 98%   BMI 24.53 kg/m²        Physical Exam  Vitals reviewed.    Constitutional:       Appearance: Normal appearance. She is well-developed.   Cardiovascular:      Rate and Rhythm: Normal rate and regular rhythm.      Heart sounds: Normal heart sounds.   Pulmonary:      Effort: Pulmonary effort is normal.      Breath sounds: Normal breath sounds.   Abdominal:      Tenderness: There is abdominal tenderness (suprapubic). There is no guarding or rebound.   Skin:     General: Skin is warm.   Neurological:      General: No focal deficit present.      Mental Status: She is alert and oriented to person, place, and time.   Psychiatric:         Mood and Affect: Mood normal.         Behavior: Behavior normal.         Thought Content: Thought content normal.         Judgment: Judgment normal.                Renata Diaz MD

## 2024-04-06 LAB — BACTERIA UR CULT: NORMAL

## 2024-04-08 DIAGNOSIS — N39.0 URINARY TRACT INFECTION WITHOUT HEMATURIA, SITE UNSPECIFIED: Primary | ICD-10-CM

## 2024-04-08 RX ORDER — NITROFURANTOIN 25; 75 MG/1; MG/1
100 CAPSULE ORAL 2 TIMES DAILY
Qty: 10 CAPSULE | Refills: 0 | Status: SHIPPED | OUTPATIENT
Start: 2024-04-08

## 2024-04-19 NOTE — PROGRESS NOTES
Assessment/Plan:    Problem List Items Addressed This Visit       History of robot-assisted laparoscopic hysterectomy - Primary     33 y.o. presents to the office for post-operative evaluation.     Pathology c/w benign fibroids. She did experience a periurethral laceration on removal of the large uterus.     Healing well post op. Remaining sutures removed without difficulty. Vaginal laceration well healed.    No further need for oncologic surveillance. Pt to continue with routine gyn care                   CHIEF COMPLAINT: post op        Problem:   Cancer Staging   No matching staging information was found for the patient.        Previous therapy:  Oncology History    No history exists.         Patient ID: Taylor John is a 33 y.o. adult  33 y.o. presents to the office for post-operative evaluation. Pt reports overall feeling well. First few weeks were hard. She has some discomfort at the vaginal incision on prolonged sitting. No vaginal bleeding or discharge. She reports continued pressure during urination but different than from fibroid. No burning. No abdominal pain.         The following portions of the patient's history were reviewed and updated as appropriate: allergies, current medications, past family history, past medical history, past social history, past surgical history, and problem list.    Review of Systems   Constitutional: Negative.    HENT: Negative.     Eyes: Negative.    Respiratory: Negative.     Cardiovascular: Negative.    Gastrointestinal: Negative.    Endocrine: Negative.    Genitourinary: Negative.    Musculoskeletal: Negative.    Neurological: Negative.    Psychiatric/Behavioral: Negative.           Current Outpatient Medications:     cetirizine (ZyrTEC) 10 mg tablet, Take 10 mg by mouth daily, Disp: , Rfl:     famotidine (PEPCID) 20 mg tablet, Take 1 tablet (20 mg total) by mouth 2 (two) times a day, Disp: 180 tablet, Rfl: 1    Ibuprofen (Advil) 200 MG CAPS, Take by mouth, Disp: , Rfl:      levalbuterol (XOPENEX HFA) 45 mcg/act inhaler, Inhale 1-2 puffs every 8 (eight) hours as needed for wheezing, Disp: 15 g, Rfl: 5    LORazepam (ATIVAN) 1 mg tablet, Take 1 tablet (1 mg total) by mouth every 6 (six) hours as needed for anxiety, Disp: 8 tablet, Rfl: 0    nitrofurantoin (MACROBID) 100 mg capsule, Take 1 capsule (100 mg total) by mouth 2 (two) times a day, Disp: 10 capsule, Rfl: 0    sertraline (ZOLOFT) 25 mg tablet, Take 1 tablet (25 mg total) by mouth daily, Disp: 30 tablet, Rfl: 1    Triamcinolone Acetonide (Nasacort Allergy 24HR) 55 MCG/ACT nasal spray, by Each Nare route daily, Disp: , Rfl:     amphetamine-dextroamphetamine (ADDERALL XR, 5MG,) 5 MG 24 hr capsule, Take 1 capsule (5 mg total) by mouth every morning Max Daily Amount: 5 mg (Patient not taking: Reported on 1/25/2024), Disp: 30 capsule, Rfl: 0    cyclobenzaprine (FLEXERIL) 10 mg tablet, Take 1 tablet (10 mg total) by mouth 3 (three) times a day as needed for muscle spasms (Patient not taking: Reported on 4/3/2024), Disp: 5 tablet, Rfl: 0    naloxone (NARCAN) 4 mg/0.1 mL nasal spray, Administer 1 spray into a nostril. If no response after 2-3 minutes, give another dose in the other nostril using a new spray., Disp: 1 each, Rfl: 1    Objective:    Blood pressure 110/76, pulse 91, height 5' (1.524 m), weight 57.8 kg (127 lb 8 oz), last menstrual period 03/14/2024, SpO2 99%.  Body mass index is 24.9 kg/m².  Body surface area is 1.54 meters squared.    Physical Exam  HENT:      Head: Normocephalic and atraumatic.   Cardiovascular:      Rate and Rhythm: Normal rate and regular rhythm.   Pulmonary:      Effort: Pulmonary effort is normal.   Abdominal:      General: There is no distension.      Palpations: Abdomen is soft. There is no mass.      Comments: Incisions c/d/i   Genitourinary:     Comments: Chaperone present for exam.  The external female genitalia is normal. The bartholin's, uretheral and skenes glands are normal. The urethral meatus  is normal (midline with no lesions). Anus without fissure or lesion. Speculum exam reveals a grossly normal vagina cuff that is healing well. No masses, lesions,discharge or bleeding.  Bimanual exam notes a surgical absent cervix, uterus and adnexal structures. No masses or fullness. Bladder is without fullness, mass or tenderness.      Musculoskeletal:         General: Normal range of motion.      Cervical back: Normal range of motion.   Skin:     General: Skin is warm and dry.   Neurological:      Mental Status: She is alert and oriented to person, place, and time.

## 2024-04-19 NOTE — ASSESSMENT & PLAN NOTE
33 y.o. presents to the office for post-operative evaluation.     Pathology c/w benign fibroids. She did experience a periurethral laceration on removal of the large uterus.     Healing well post op. Remaining sutures removed without difficulty. Vaginal laceration well healed.    No further need for oncologic surveillance. Pt to continue with routine gyn care

## 2024-04-22 ENCOUNTER — OFFICE VISIT (OUTPATIENT)
Dept: GYNECOLOGIC ONCOLOGY | Facility: CLINIC | Age: 34
End: 2024-04-22

## 2024-04-22 VITALS
WEIGHT: 127.5 LBS | OXYGEN SATURATION: 99 % | DIASTOLIC BLOOD PRESSURE: 76 MMHG | HEART RATE: 91 BPM | SYSTOLIC BLOOD PRESSURE: 110 MMHG | BODY MASS INDEX: 25.03 KG/M2 | HEIGHT: 60 IN

## 2024-04-22 DIAGNOSIS — Z90.710 HISTORY OF ROBOT-ASSISTED LAPAROSCOPIC HYSTERECTOMY: Primary | ICD-10-CM

## 2024-04-22 PROCEDURE — 99024 POSTOP FOLLOW-UP VISIT: CPT | Performed by: OBSTETRICS & GYNECOLOGY

## 2024-06-17 ENCOUNTER — PATIENT MESSAGE (OUTPATIENT)
Dept: FAMILY MEDICINE CLINIC | Facility: CLINIC | Age: 34
End: 2024-06-17

## 2024-06-18 DIAGNOSIS — Z00.6 ENCOUNTER FOR EXAMINATION FOR NORMAL COMPARISON OR CONTROL IN CLINICAL RESEARCH PROGRAM: ICD-10-CM

## 2024-06-27 DIAGNOSIS — F90.2 ADHD (ATTENTION DEFICIT HYPERACTIVITY DISORDER), COMBINED TYPE: ICD-10-CM

## 2024-06-27 RX ORDER — DEXTROAMPHETAMINE SACCHARATE, AMPHETAMINE ASPARTATE MONOHYDRATE, DEXTROAMPHETAMINE SULFATE AND AMPHETAMINE SULFATE 1.25; 1.25; 1.25; 1.25 MG/1; MG/1; MG/1; MG/1
5 CAPSULE, EXTENDED RELEASE ORAL EVERY MORNING
Qty: 30 CAPSULE | Refills: 0 | Status: SHIPPED | OUTPATIENT
Start: 2024-06-27

## 2024-07-01 ENCOUNTER — TELEPHONE (OUTPATIENT)
Dept: FAMILY MEDICINE CLINIC | Facility: CLINIC | Age: 34
End: 2024-07-01

## 2024-07-17 ENCOUNTER — TELEPHONE (OUTPATIENT)
Age: 34
End: 2024-07-17

## 2024-07-17 NOTE — TELEPHONE ENCOUNTER
ANTONIA: Aurelia from [facility]  called to let us know the Adderall for the pt was approved and she was letting the pharmacy know now also

## 2024-07-19 NOTE — TELEPHONE ENCOUNTER
Duplicate encounter created, please see telephone encounter from 7-1-24 regarding Amphetamine-Dextroamphetamine ER PA status. Please review patient's chart to see if there is already an encounter regarding the medication in question and to document anything regarding this medication in regards to anything regarding the authorization process etc before creating another encounter Thank You.

## 2024-07-22 ENCOUNTER — TELEPHONE (OUTPATIENT)
Age: 34
End: 2024-07-22

## 2024-07-22 NOTE — TELEPHONE ENCOUNTER
[facility] called to say patient withdrew her request for Adderall and does not want the medication.  Thank you.

## 2024-08-09 DIAGNOSIS — N95.1 PERIMENOPAUSAL VASOMOTOR SYMPTOMS: Primary | ICD-10-CM

## 2024-10-04 DIAGNOSIS — R05.3 PERSISTENT COUGH: ICD-10-CM

## 2024-10-04 RX ORDER — FAMOTIDINE 20 MG/1
20 TABLET, FILM COATED ORAL 2 TIMES DAILY
Qty: 180 TABLET | Refills: 0 | Status: SHIPPED | OUTPATIENT
Start: 2024-10-04

## 2024-11-11 ENCOUNTER — APPOINTMENT (OUTPATIENT)
Dept: LAB | Facility: MEDICAL CENTER | Age: 34
End: 2024-11-11
Payer: COMMERCIAL

## 2024-11-11 DIAGNOSIS — Z00.6 ENCOUNTER FOR EXAMINATION FOR NORMAL COMPARISON OR CONTROL IN CLINICAL RESEARCH PROGRAM: ICD-10-CM

## 2024-11-11 DIAGNOSIS — Z01.812 ENCOUNTER FOR PRE-OPERATIVE LABORATORY TESTING: ICD-10-CM

## 2024-11-11 DIAGNOSIS — D25.2 INTRAMURAL AND SUBSEROUS LEIOMYOMA OF UTERUS: ICD-10-CM

## 2024-11-11 DIAGNOSIS — N95.1 PERIMENOPAUSAL VASOMOTOR SYMPTOMS: ICD-10-CM

## 2024-11-11 DIAGNOSIS — R30.0 DYSURIA: ICD-10-CM

## 2024-11-11 DIAGNOSIS — D25.1 INTRAMURAL AND SUBSEROUS LEIOMYOMA OF UTERUS: ICD-10-CM

## 2024-11-11 DIAGNOSIS — D25.9 UTERINE LEIOMYOMA, UNSPECIFIED LOCATION: ICD-10-CM

## 2024-11-11 DIAGNOSIS — N94.6 DYSMENORRHEA: ICD-10-CM

## 2024-11-11 LAB
FSH SERPL-ACNC: 5 MIU/ML
TSH SERPL DL<=0.05 MIU/L-ACNC: 2.81 UIU/ML (ref 0.45–4.5)

## 2024-11-11 PROCEDURE — 36415 COLL VENOUS BLD VENIPUNCTURE: CPT

## 2024-11-11 PROCEDURE — 84443 ASSAY THYROID STIM HORMONE: CPT

## 2024-11-11 PROCEDURE — 83001 ASSAY OF GONADOTROPIN (FSH): CPT

## 2024-11-13 ENCOUNTER — OFFICE VISIT (OUTPATIENT)
Dept: FAMILY MEDICINE CLINIC | Facility: CLINIC | Age: 34
End: 2024-11-13

## 2024-11-13 VITALS
DIASTOLIC BLOOD PRESSURE: 68 MMHG | HEIGHT: 60 IN | OXYGEN SATURATION: 99 % | SYSTOLIC BLOOD PRESSURE: 106 MMHG | WEIGHT: 128 LBS | BODY MASS INDEX: 25.13 KG/M2 | TEMPERATURE: 97.1 F | HEART RATE: 89 BPM

## 2024-11-13 DIAGNOSIS — M79.7 FIBROMYALGIA: ICD-10-CM

## 2024-11-13 DIAGNOSIS — R42 POSTURAL DIZZINESS: ICD-10-CM

## 2024-11-13 DIAGNOSIS — R05.3 PERSISTENT COUGH: ICD-10-CM

## 2024-11-13 DIAGNOSIS — Z00.00 ANNUAL PHYSICAL EXAM: Primary | ICD-10-CM

## 2024-11-13 DIAGNOSIS — R00.2 PALPITATIONS: ICD-10-CM

## 2024-11-13 DIAGNOSIS — M25.50 ARTHRALGIA, UNSPECIFIED JOINT: ICD-10-CM

## 2024-11-13 DIAGNOSIS — F41.9 ANXIETY: ICD-10-CM

## 2024-11-13 RX ORDER — FAMOTIDINE 20 MG/1
20 TABLET, FILM COATED ORAL 2 TIMES DAILY
Qty: 180 TABLET | Refills: 3 | Status: SHIPPED | OUTPATIENT
Start: 2024-11-13

## 2024-11-13 NOTE — PATIENT INSTRUCTIONS
Patient Education     Fibromyalgia   The Basics   Written by the doctors and editors at [facility]   What is fibromyalgia? -- This is a condition that causes people to feel pain in the muscles and soft tissues all over their body. People with fibromyalgia also have many places on their body that hurt a lot when they are touched. This is likely due to the brain being more sensitive to pain. No one knows what causes this increased sensitivity. Fibromyalgia is not thought to be caused by inflammation.  Can fibromyalgia be cured? -- In some people, fibromyalgia seems to get better. But in most people, it cannot be cured. Even so, people can learn to deal with the condition and live fairly normal lives. Fibromyalgia does not get worse over time, and it is not life-threatening.  Does fibromyalgia cause symptoms besides muscle pain? -- Yes. People with fibromyalgia often say that they feel tired all of the time and that sleep does not help them feel rested. They can also have:   Trouble thinking clearly   Flu-like symptoms   Headaches   Depression and anxiety   Stomach pain   Too many or too few bowel movements (diarrhea or constipation)   Pain in the bladder, or the need to urinate in a hurry or often   Problems with the jaw  Is there a test for fibromyalgia? -- No. To diagnose it, doctors and nurses review your symptoms. First, they look for other causes of the symptoms, such as arthritis or a hormone problem.  Doctors might diagnose fibromyalgia if you have pain in many parts of your body and they cannot find another cause. This is more likely if you also have other symptoms that can happen in fibromyalgia, such as trouble sleeping, feeling very tired, and trouble thinking clearly.  How is fibromyalgia treated? -- There are medicines and other things to help with the symptoms of fibromyalgia. But there is no single treatment that works for everyone. You and your health care team will work together to find the right mix of  "treatments for you. In general, treatment can include:   Medicines to relieve pain, improve sleep, or improve mood   Physical therapy to learn exercises and stretches   Relaxation therapy   Working with a counselor  To get the best treatment, many people need a team that includes:   A doctor or other health care provider   A physical therapist   Someone trained in mental health (such as a  or counselor)  Should I take medicines? -- Your doctor or nurse might suggest that you take a medicine normally used to treat depression or seizures. If so, be open to trying it. Even if you are not depressed and do not have seizures, these medicines can help. That is because they work on the brain areas that deal with pain.  What can I do on my own? -- You can:   Stay active - This is a really important part of managing fibromyalgia. Walking, swimming, or biking can all help ease muscle pain. If you have not been active, it might hurt a little more when you start. But being active can help improve your symptoms.   Try to change your mental outlook - This can be hard, since being in pain can affect your mood. But your outlook also has a big effect on how you feel pain. Do your best to stay positive. If you have depression, talk to your doctor or nurse about how best to treat it.   Improve your \"sleep hygiene\" - This means following certain habits to improve your sleep. For example, avoid caffeine and alcohol, especially near bedtime. It also helps to avoid looking at screens before bed.  All topics are updated as new evidence becomes available and our peer review process is complete.  This topic retrieved from [employer] on: Apr 24, 2024.  Topic 82510 Version 14.0  Release: 32.3.2 - C32.113  © 2024 UpToDate, Inc. and/or its affiliates. All rights reserved.  figure 1: Tender points in fibromyalgia     People with fibromyalgia usually have many \"tender points\" on their body that hurt when touched or pressed on. These " "tender points are bilateral, meaning that they are the same on both the left and right side of the body. Most people with fibromyalgia have tenderness on at least 11 of the 18 points shown in this picture. They usually have also had pain throughout the body for at least three months.  Graphic 45141 Version 6.0  Consumer Information Use and Disclaimer   Disclaimer: This generalized information is a limited summary of diagnosis, treatment, and/or medication information. It is not meant to be comprehensive and should be used as a tool to help the user understand and/or assess potential diagnostic and treatment options. It does NOT include all information about conditions, treatments, medications, side effects, or risks that may apply to a specific patient. It is not intended to be medical advice or a substitute for the medical advice, diagnosis, or treatment of a health care provider based on the health care provider's examination and assessment of a patient's specific and unique circumstances. Patients must speak with a health care provider for complete information about their health, medical questions, and treatment options, including any risks or benefits regarding use of medications. This information does not endorse any treatments or medications as safe, effective, or approved for treating a specific patient. UpToDate, Inc. and its affiliates disclaim any warranty or liability relating to this information or the use thereof.The use of this information is governed by the Terms of Use, available at https://www.Hitwiseuwer.com/en/know/clinical-effectiveness-terms. 2024© UpToDate, Inc. and its affiliates and/or licensors. All rights reserved.  Copyright   © 2024 UpToDate, Inc. and/or its affiliates. All rights reserved.  Patient Education     Routine physical for adults   The Basics   Written by the doctors and editors at [employer]   What is a physical? -- A physical is a routine visit, or \"check-up,\" with your " "doctor. You might also hear it called a \"wellness visit\" or \"preventive visit.\"  During each visit, the doctor will:   Ask about your physical and mental health   Ask about your habits, behaviors, and lifestyle   Do an exam   Give you vaccines if needed   Talk to you about any medicines you take   Give advice about your health   Answer your questions  Getting regular check-ups is an important part of taking care of your health. It can help your doctor find and treat any problems you have. But it's also important for preventing health problems.  A routine physical is different from a \"sick visit.\" A sick visit is when you see a doctor because of a health concern or problem. Since physicals are scheduled ahead of time, you can think about what you want to ask the doctor.  How often should I get a physical? -- It depends on your age and health. In general, for people age 21 years and older:   If you are younger than 50 years, you might be able to get a physical every 3 years.   If you are 50 years or older, your doctor might recommend a physical every year.  If you have an ongoing health condition, like diabetes or high blood pressure, your doctor will probably want to see you more often.  What happens during a physical? -- In general, each visit will include:   Physical exam - The doctor or nurse will check your height, weight, heart rate, and blood pressure. They will also look at your eyes and ears. They will ask about how you are feeling and whether you have any symptoms that bother you.   Medicines - It's a good idea to bring a list of all the medicines you take to each doctor visit. Your doctor will talk to you about your medicines and answer any questions. Tell them if you are having any side effects that bother you. You should also tell them if you are having trouble paying for any of your medicines.   Habits and behaviors - This includes:   Your diet   Your exercise habits   Whether you smoke, drink alcohol, " "or use drugs   Whether you are sexually active   Whether you feel safe at home  Your doctor will talk to you about things you can do to improve your health and lower your risk of health problems. They will also offer help and support. For example, if you want to quit smoking, they can give you advice and might prescribe medicines. If you want to improve your diet or get more physical activity, they can help you with this, too.   Lab tests, if needed - The tests you get will depend on your age and situation. For example, your doctor might want to check your:   Cholesterol   Blood sugar   Iron level   Vaccines - The recommended vaccines will depend on your age, health, and what vaccines you already had. Vaccines are very important because they can prevent certain serious or deadly infections.   Discussion of screening - \"Screening\" means checking for diseases or other health problems before they cause symptoms. Your doctor can recommend screening based on your age, risk, and preferences. This might include tests to check for:   Cancer, such as breast, prostate, cervical, ovarian, colorectal, prostate, lung, or skin cancer   Sexually transmitted infections, such as chlamydia and gonorrhea   Mental health conditions like depression and anxiety  Your doctor will talk to you about the different types of screening tests. They can help you decide which screenings to have. They can also explain what the results might mean.   Answering questions - The physical is a good time to ask the doctor or nurse questions about your health. If needed, they can refer you to other doctors or specialists, too.  Adults older than 65 years often need other care, too. As you get older, your doctor will talk to you about:   How to prevent falling at home   Hearing or vision tests   Memory testing   How to take your medicines safely   Making sure that you have the help and support you need at home  All topics are updated as new evidence becomes " available and our peer review process is complete.  This topic retrieved from [employer] on: May 02, 2024.  Topic 987768 Version 1.0  Release: 32.4.3 - C32.122  © 2024 UpToDate, Inc. and/or its affiliates. All rights reserved.  Consumer Information Use and Disclaimer   Disclaimer: This generalized information is a limited summary of diagnosis, treatment, and/or medication information. It is not meant to be comprehensive and should be used as a tool to help the user understand and/or assess potential diagnostic and treatment options. It does NOT include all information about conditions, treatments, medications, side effects, or risks that may apply to a specific patient. It is not intended to be medical advice or a substitute for the medical advice, diagnosis, or treatment of a health care provider based on the health care provider's examination and assessment of a patient's specific and unique circumstances. Patients must speak with a health care provider for complete information about their health, medical questions, and treatment options, including any risks or benefits regarding use of medications. This information does not endorse any treatments or medications as safe, effective, or approved for treating a specific patient. UpToDate, Inc. and its affiliates disclaim any warranty or liability relating to this information or the use thereof.The use of this information is governed by the Terms of Use, available at https://www.wolters"[employer]uwer.com/en/know/clinical-effectiveness-terms. 2024© UpToDate, Inc. and its affiliates and/or licensors. All rights reserved.  Copyright   © 2024 UpToDate, Inc. and/or its affiliates. All rights reserved.

## 2024-11-13 NOTE — PROGRESS NOTES
Adult Annual Physical  Name: Taylor John      : 1990      MRN: [id number]  Encounter Provider: Adarsh Cardona MD  Encounter Date: 2024   Encounter department: [facility]    Assessment & Plan  Annual physical exam         Anxiety         Palpitations    Orders:    Holter monitor; Future    Ambulatory Referral to Cardiology; Future    Postural dizziness  Obtain testing as ordered   Possible POTS   Orders:    Holter monitor; Future    Ambulatory Referral to Cardiology; Future    Fibromyalgia  Obtain labs as ordered   Refer to PT for assessment and recommendations for safe exercise   Orders:    Ambulatory Referral to Physical Therapy; Future    Arthralgia, unspecified joint    Orders:    TSH, 3rd generation with Free T4 reflex; Future    UA (URINE) with reflex to Scope; Future    Sedimentation rate, automated; Future    HIV 1/2 AG/AB w Reflex [facility] for 2 yr old and above; Future    RPR-Syphilis Screening (Total Syphilis IGG/IGM); Future    JACINDA Screen w/ Reflex to Titer/Pattern; Future    Chronic Hepatitis Panel; Future    C-reactive protein; Future    Cyclic citrul peptide antibody, IgG; Future    RF Screen w/ Reflex to Titer; Future    Vitamin B12; Future    Vitamin D 25 hydroxy; Future    Lyme Total AB W Reflex to IGM/IGG; Future    Thyroid Antibodies Panel; Future    Persistent cough    Orders:    famotidine (PEPCID) 20 mg tablet; Take 1 tablet (20 mg total) by mouth 2 (two) times a day    Immunizations and preventive care screenings were discussed with patient today. Appropriate education was printed on patient's after visit summary.    Counseling:  Dental Health: discussed importance of regular tooth brushing, flossing, and dental visits.  Exercise: the importance of regular exercise/physical activity was discussed. Recommend exercise 3-5 times per week for at least 30 minutes.       Depression Screening and Follow-up Plan: Patient was screened for depression  during today's encounter. They screened negative with a PHQ-2 score of 2.        History of Present Illness     Adult Annual Physical:  Patient presents for annual physical.     Diet and Physical Activity:  - Diet/Nutrition: well balanced diet.    Depression Screening:  - PHQ-2 Score: 2    General Health:  - Sleep: sleeps well.  - Dental: regular dental visits.      She has an inhaler and it helps when she can't breathe well. Does not use it often.     Patient reports she was not feeling well since her surgery. She's had increased anxiety. She wakes up every morning with panic and sweating. She feels shaky. She states her friend has POTS and she has similar symptoms. Her service dog has alerted to the patient as well. She often craves salt. Stands up and feels vision goes black at times. She has never fainted. Sometimes symptoms laying to sitting, sometimes also gets dizzy going to lay down. She feels like her heart races often.   She sees a chiropractor. She has frequent common pain. Pain is mostly muscles. Rest does not help. Not sure if activity helps. Pain comes and goes without provocation. Sometimes joints hurt. Never sees redness/swelling in the joints.   It takes her over an hour to get up and out of bed. She feels her brain won't wake up. Lots of brain fog. She feels physically frozen but it's not painful. Denies morning stiffness.   She feels like she's been struggling with all these issues since a child, particularly started getting treated in her teens.   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 She feels she struggles to do her art due to her issues. If she doesn't feel well she states she hyperfocuses on it and struggles to move past it.   Sees a psychiatrist in a few weeks to establish care.   She drinks a lot - feels it's a nervous stim for her. Frequently feels thirsty. 24oz x 3 +tea. She craves salt and eats it often through the day. Eating salt does make her feel better.      No known autoimmune disease in the family. Dad's sister possible thyroid issues.     Review of Systems   Constitutional:  Negative for chills and fever.   HENT:  Negative for ear pain and sore throat.    Eyes:  Negative for pain and visual disturbance.   Respiratory:  Negative for cough and shortness of breath.    Cardiovascular:  Negative for chest pain and palpitations.   Gastrointestinal:  Negative for abdominal pain and vomiting.   Genitourinary:  Negative for dysuria and hematuria.   Musculoskeletal:  Negative for arthralgias and back pain.   Skin:  Negative for color change and rash.   Neurological:  Negative for seizures and syncope.   All other systems reviewed and are negative.    Medical History Reviewed by provider this encounter:  Tobacco  Allergies  Meds  Problems  Med Hx  Surg Hx  Fam Hx         Objective     /68   Pulse 89   Temp (!) 97.1 °F (36.2 °C)   Ht 5' (1.524 m)   Wt 58.1 kg (128 lb)   LMP 03/14/2024   SpO2 99%   BMI 25.00 kg/m²     Physical Exam  Vitals and nursing note reviewed.   Constitutional:       General: She is not in acute distress.     Appearance: She is well-developed.   HENT:      Head: Normocephalic and atraumatic.   Eyes:      Conjunctiva/sclera: Conjunctivae normal.      Pupils: Pupils are equal, round, and reactive to light.   Neck:      Thyroid: No thyromegaly.      Trachea: No tracheal deviation.   Cardiovascular:      Rate and Rhythm: Normal rate and regular rhythm.      Heart sounds: Normal heart sounds. No murmur heard.  Pulmonary:      Effort: Pulmonary effort is normal. No respiratory distress.      Breath sounds: Normal breath sounds. No wheezing.   Abdominal:      General: Bowel sounds are normal. There is no distension.      Palpations: Abdomen is soft.      Tenderness: There is no abdominal tenderness.   Musculoskeletal:      Comments: Patient tender to palpation over multiple muscle groups    Skin:     General: Skin is warm and dry.    Neurological:      Mental Status: She is alert and oriented to person, place, and time.

## 2024-11-17 NOTE — ASSESSMENT & PLAN NOTE
Orders:    famotidine (PEPCID) 20 mg tablet; Take 1 tablet (20 mg total) by mouth 2 (two) times a day

## 2024-11-19 LAB
APOB+LDLR+PCSK9 GENE MUT ANL BLD/T: NOT DETECTED
BRCA1+BRCA2 DEL+DUP + FULL MUT ANL BLD/T: NOT DETECTED
MLH1+MSH2+MSH6+PMS2 GN DEL+DUP+FUL M: NOT DETECTED

## 2024-12-04 ENCOUNTER — HOSPITAL ENCOUNTER (OUTPATIENT)
Dept: NON INVASIVE DIAGNOSTICS | Facility: CLINIC | Age: 34
Discharge: HOME | End: 2024-12-04
Payer: COMMERCIAL

## 2024-12-04 DIAGNOSIS — R00.2 PALPITATIONS: ICD-10-CM

## 2024-12-04 DIAGNOSIS — R42 POSTURAL DIZZINESS: ICD-10-CM

## 2024-12-04 PROCEDURE — 93226 XTRNL ECG REC<48 HR SCAN A/R: CPT

## 2024-12-04 PROCEDURE — 93225 XTRNL ECG REC<48 HRS REC: CPT

## 2024-12-10 ENCOUNTER — EVALUATION (OUTPATIENT)
Dept: PHYSICAL THERAPY | Facility: CLINIC | Age: 34
End: 2024-12-10
Payer: COMMERCIAL

## 2024-12-10 DIAGNOSIS — M79.7 FIBROMYALGIA: ICD-10-CM

## 2024-12-10 PROCEDURE — 97530 THERAPEUTIC ACTIVITIES: CPT

## 2024-12-10 PROCEDURE — 97162 PT EVAL MOD COMPLEX 30 MIN: CPT

## 2024-12-10 NOTE — PROGRESS NOTES
PT Evaluation     Today's date: 12/10/2024  Patient name: Taylor John  : 1990  MRN: [id number]  Referring provider: Chino Cardona*  Dx:   Encounter Diagnosis     ICD-10-CM    1. Fibromyalgia  M79.7 Ambulatory Referral to Physical Therapy          Start Time: 145  Stop Time: 1534  Total time in clinic (min): 42 minutes    Assessment  Impairments: abnormal gait, abnormal or restricted ROM, abnormal movement, activity intolerance, impaired balance, impaired physical strength, lacks appropriate home exercise program, pain with function, poor posture , poor body mechanics, participation limitations, activity limitations and endurance    Assessment details: Taylor John is a 34 y.o. adult who presents with signs and symptoms consistent of fibromyalgia or elhers danlos type of presentation. Patient presents with pain, decreased strength, and postural dysfunction. Due to these impairments, Patient has difficulty performing prolonged sitting, prolonged standing, reaching , transferring, weight bearing over involved LE, stair negotiating , and attending social events. Patient would benefit from skilled physical therapy to address the impairments, improve their level of function, and to improve their overall quality of life. Reviewed HEP, involved anatomy, physio as well as POC with verbalized understanding and pt is in agreement with above.     Goals  Short Term Goals: to be achieved by 4 weeks  1) Patient to be independent with basic HEP  2) Decrease pain to 3/10 at its worst at least 50% of the time  3) Increase global strength by 1/2 MMT grade in all affected planes    Long Term Goals: to be achieved by discharge  1) FOTO equal to or greater than projected goal.  2) Ambulation to improve to at least 15 min consecutively  3) Stair negotiation will improve to reciprocal without an increase in pain  4) Sit to stand transfers will improve to maximal level of function      Plan  Patient would benefit from:  skilled physical therapy    Planned therapy interventions: abdominal trunk stabilization, ADL training, balance, body mechanics training, home exercise program, functional ROM exercises, flexibility, therapeutic exercise, therapeutic activities, stretching, strengthening, joint mobilization, manual therapy, neuromuscular re-education, patient education and postural training    Frequency: 1x week  Duration in weeks: 10  Treatment plan discussed with: patient      Subjective Evaluation    History of Present Illness  Mechanism of injury: History: Pt presents to PT reporting that she has always had chronic pain. Pt was referred under fibromyalgia and she notes she has saumya told she has since she was 14 and just hasn't don't anything about it. Feels like she has random pain and can't unclench her muscles. Pt would like to be more comfortable with her body. Pt notes that it tends to be triggered by anxiety. Pt had a historectomy and feels her hips have not fully healed and can feel with it with standing or walking for longer periods of time. 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 Some days she's anxious and she's in a great deal of pain and she'll just work from home.  Getting up/down can cause her to be dizzy. Most of her pain is in her R shoulder blade and mostly the R side of her body. Pt does not she doesn't get headaches too often. Standing for longer then 15-20 min is difficult and will make her feel uncomfortable. Typically will know if its one of those days when she wakes up. Chiro 1x/wk. Pt was walking every day a little less then a mile. Cannot tolerate the cold and cannot tolerate it. Do things more physically confidently PT GOAL    Aggravating factors: movement  Relieving factors:   Imaging:   Occupation: artist and   Hobbies/recreation: art,   Patient Goals  Patient goals for therapy: decreased pain and increased strength    Pain  Current pain rating:  "2  At best pain ratin  At worst pain ratin    Social Support  Steps to enter house: yes  Stairs in house: yes     Employment status: working  Hand dominance: right    Treatments  Previous treatment: chiropractic  Current treatment: physical therapy      Objective     Active Range of Motion   Cervical/Thoracic Spine     Normal active range of motion  Left Shoulder   Normal active range of motion    Right Shoulder   Normal active range of motion    Left Elbow   Normal active range of motion  Elbow hyperextension    Right Elbow   Normal active range of motion  Elbow hyperextension    Functional Assessment      Squat    Left valgus, left tibial anterior translation beyond toes, right valgus and right tibial anterior translation beyond toes.       global LE & UE strength of 3+/5       Precautions: Fibromyalgia, anxiety      Manuals 12/10                                                                Neuro Re-Ed             Scap squeeze 3\"x10            rows             Chin tucks                                                                 Ther Ex             UBE             TM             NuStep                                                                              Ther Activity             Pt edu: involved anatomy, physio, HEP, POC, movement patterns  RE                         Gait Training                                       Modalities                                            "

## 2024-12-12 ENCOUNTER — RESULTS FOLLOW-UP (OUTPATIENT)
Dept: FAMILY MEDICINE CLINIC | Facility: CLINIC | Age: 34
End: 2024-12-12

## 2024-12-12 PROCEDURE — 93227 XTRNL ECG REC<48 HR R&I: CPT | Performed by: INTERNAL MEDICINE

## 2024-12-17 ENCOUNTER — APPOINTMENT (OUTPATIENT)
Dept: PHYSICAL THERAPY | Facility: CLINIC | Age: 34
End: 2024-12-17
Payer: COMMERCIAL

## 2025-01-09 ENCOUNTER — OFFICE VISIT (OUTPATIENT)
Dept: PHYSICAL THERAPY | Facility: CLINIC | Age: 35
End: 2025-01-09
Payer: COMMERCIAL

## 2025-01-09 ENCOUNTER — APPOINTMENT (OUTPATIENT)
Dept: LAB | Facility: CLINIC | Age: 35
End: 2025-01-09
Payer: COMMERCIAL

## 2025-01-09 DIAGNOSIS — M25.50 ARTHRALGIA, UNSPECIFIED JOINT: ICD-10-CM

## 2025-01-09 DIAGNOSIS — M79.7 FIBROMYALGIA: Primary | ICD-10-CM

## 2025-01-09 LAB
25(OH)D3 SERPL-MCNC: 47.2 NG/ML (ref 30–100)
CRP SERPL QL: <1 MG/L
ERYTHROCYTE [SEDIMENTATION RATE] IN BLOOD: 5 MM/HOUR (ref 0–14)
TSH SERPL DL<=0.05 MIU/L-ACNC: 2.27 UIU/ML (ref 0.45–4.5)
VIT B12 SERPL-MCNC: 827 PG/ML (ref 180–914)

## 2025-01-09 PROCEDURE — 84443 ASSAY THYROID STIM HORMONE: CPT

## 2025-01-09 PROCEDURE — 86800 THYROGLOBULIN ANTIBODY: CPT

## 2025-01-09 PROCEDURE — 87340 HEPATITIS B SURFACE AG IA: CPT

## 2025-01-09 PROCEDURE — 82306 VITAMIN D 25 HYDROXY: CPT

## 2025-01-09 PROCEDURE — 86705 HEP B CORE ANTIBODY IGM: CPT

## 2025-01-09 PROCEDURE — 86376 MICROSOMAL ANTIBODY EACH: CPT

## 2025-01-09 PROCEDURE — 97112 NEUROMUSCULAR REEDUCATION: CPT

## 2025-01-09 PROCEDURE — 86618 LYME DISEASE ANTIBODY: CPT

## 2025-01-09 PROCEDURE — 86430 RHEUMATOID FACTOR TEST QUAL: CPT

## 2025-01-09 PROCEDURE — 85652 RBC SED RATE AUTOMATED: CPT

## 2025-01-09 PROCEDURE — 86225 DNA ANTIBODY NATIVE: CPT

## 2025-01-09 PROCEDURE — 36415 COLL VENOUS BLD VENIPUNCTURE: CPT

## 2025-01-09 PROCEDURE — 86140 C-REACTIVE PROTEIN: CPT

## 2025-01-09 PROCEDURE — 97110 THERAPEUTIC EXERCISES: CPT

## 2025-01-09 PROCEDURE — 86803 HEPATITIS C AB TEST: CPT

## 2025-01-09 PROCEDURE — 87389 HIV-1 AG W/HIV-1&-2 AB AG IA: CPT

## 2025-01-09 PROCEDURE — 86704 HEP B CORE ANTIBODY TOTAL: CPT

## 2025-01-09 PROCEDURE — 86780 TREPONEMA PALLIDUM: CPT

## 2025-01-09 PROCEDURE — 82607 VITAMIN B-12: CPT

## 2025-01-09 PROCEDURE — 86200 CCP ANTIBODY: CPT

## 2025-01-09 PROCEDURE — 86038 ANTINUCLEAR ANTIBODIES: CPT

## 2025-01-09 NOTE — PROGRESS NOTES
"Daily Note     Today's date: 2025  Patient name: Taylor John  : 1990  MRN: [id number]  Referring provider: Chino Cardona*  Dx:   Encounter Diagnosis     ICD-10-CM    1. Fibromyalgia  M79.7           Start Time: 1140  Stop Time: 1218  Total time in clinic (min): 38 minutes    Subjective: Pt notes she is having trouble sitting still today and been feeling dizzy.      Objective: See treatment diary below      Assessment: Tolerated treatment well. Patient demonstrated fatigue post treatment, exhibited good technique with therapeutic exercises, and would benefit from continued PT. Pt  notes that she feels better when she eats salt. She discussed getting the blood work that was ordered for there that she never got. Pt did note she felt overall less dizzy by the end of the session.      Plan: Continue per plan of care.  Progress treatment as tolerated.       Precautions: Fibromyalgia, anxiety      Manuals 12/10 1/9                                                               Neuro Re-Ed             Scap squeeze 3\"x10 3\"x10           rows             Chin tucks             Shoulder rolls  Fw/bw 10x            Cervical flexion  10x                                     Ther Ex             UBE             TM             NuStep  L2 7.5'           Pball rollout (F/R/L)  3\"x10 ea                                                               Ther Activity             Pt edu: involved anatomy, physio, HEP, POC, movement patterns  RE                         Gait Training                                       Modalities                                            "

## 2025-01-10 LAB
B BURGDOR IGG+IGM SER QL IA: NEGATIVE
CCP AB SER IA-ACNC: 1.6 (ref ?–10)
DSDNA IGG SERPL IA-ACNC: 1.7 IU/ML (ref ?–15)
HBV CORE AB SER QL: NORMAL
HBV CORE IGM SER QL: NORMAL
HBV SURFACE AG SER QL: NORMAL
HCV AB SER QL: NORMAL
HIV 1+2 AB+HIV1 P24 AG SERPL QL IA: NORMAL
HIV 2 AB SERPL QL IA: NORMAL
HIV1 AB SERPL QL IA: NORMAL
HIV1 P24 AG SERPL QL IA: NORMAL
NUCLEAR IGG SER IA-RTO: 0.3 RATIO (ref ?–1)
RHEUMATOID FACT SER QL LA: NEGATIVE
THYROGLOB AB SERPL-ACNC: 3 IU/ML (ref 0–0.9)
TREPONEMA PALLIDUM IGG+IGM AB [PRESENCE] IN SERUM OR PLASMA BY IMMUNOASSAY: NORMAL

## 2025-01-15 ENCOUNTER — APPOINTMENT (OUTPATIENT)
Dept: PHYSICAL THERAPY | Facility: CLINIC | Age: 35
End: 2025-01-15
Payer: COMMERCIAL

## 2025-01-15 ENCOUNTER — NURSE TRIAGE (OUTPATIENT)
Age: 35
End: 2025-01-15

## 2025-01-15 LAB — THYROPEROXIDASE AB SERPL-ACNC: 68 IU/ML (ref 0–34)

## 2025-01-15 NOTE — TELEPHONE ENCOUNTER
"Patient calling in to f/u after starting Lexapro 2.5 mg this past Saturday 1/11/25 after being advised to by her therapist. Medication was prescribed by a psychiatrist she saw in November-  with [facility]. States since she started it she has felt more depressed and anxious. Also states the medicine is causing her intrusive thoughts. For example when she was in the shower the thought popped into her head \"what would happen if I turn up the hot water\". Patient states she recognized this thought was not normal and did not act on it. She immediately got out of the shower. States this happened to her several years ago with a new birth control she as taking and had to stop it immediately. Patient states she feels safe at the moment. Is home alone but lives with mom and dad who will be home in a bit. RN offered support numbers to call and encouraged patient to reach out to therapist. Patient verbalized agreement. Please reach out to patient with provider response regarding Lexapro. Last dose 1/14/25 10:00 PM. RN instructed patient to call back/seek ER evaluation if at any point she does not feel safe or if anything changes.     Reason for Disposition   Caller has URGENT medicine question about med that PCP or specialist prescribed and triager unable to answer question    Answer Assessment - Initial Assessment Questions  1. NAME of MEDICINE: \"What medicine(s) are you calling about?\"      Lexapro 2.5 mg daily   2. QUESTION: \"What is your question?\" (e.g., double dose of medicine, side effect)      Side effect  3. PRESCRIBER: \"Who prescribed the medicine?\" Reason: if prescribed by specialist, call should be referred to that group.      Patient was prescribed Lexapro from psychiatrist - [facility]- Dr. Viera. Met with them in November and was prescribed but just started this past week after talking to therapist last week    4. SYMPTOMS: \"Do you have any symptoms?\" If Yes, ask: \"What symptoms are you " "having?\"  \"How bad are the symptoms (e.g., mild, moderate, severe)      Patient started Lexapro Saturday and started with feeling extremely depressed, anxious, causing intrusive thoughts for patient to harm self/others , itchy. Last dose 2200 1/14/25  5. PREGNANCY:  \"Is there any chance that you are pregnant?\" \"When was your last menstrual period?\"      Denies -had hysterectomy    Protocols used: Medication Question Call-Adult-[place]    "

## 2025-01-15 NOTE — TELEPHONE ENCOUNTER
Per our conversation. She is discontinuing the Lexapro. She said she will continue to reach out to the psychiatrist and see what the next steps are. Also will call the therapist to talk.

## 2025-01-16 ENCOUNTER — OFFICE VISIT (OUTPATIENT)
Dept: FAMILY MEDICINE CLINIC | Facility: CLINIC | Age: 35
End: 2025-01-16
Payer: COMMERCIAL

## 2025-01-16 VITALS
HEIGHT: 60 IN | TEMPERATURE: 97.5 F | OXYGEN SATURATION: 98 % | WEIGHT: 127 LBS | SYSTOLIC BLOOD PRESSURE: 120 MMHG | BODY MASS INDEX: 24.94 KG/M2 | DIASTOLIC BLOOD PRESSURE: 76 MMHG | HEART RATE: 91 BPM

## 2025-01-16 DIAGNOSIS — F41.9 ANXIETY: Primary | ICD-10-CM

## 2025-01-16 PROCEDURE — 99213 OFFICE O/P EST LOW 20 MIN: CPT | Performed by: FAMILY MEDICINE

## 2025-01-16 NOTE — TELEPHONE ENCOUNTER
Pt called to make sure coming to the appointment today was correct. I advised, it appeared to be.

## 2025-01-16 NOTE — PROGRESS NOTES
Name: Taylor John      : 1990      MRN: [id number]  Encounter Provider: Adarsh Cardona MD  Encounter Date: 2025   Encounter department: [facility]  :  Assessment & Plan  Anxiety  Continue off medication  Continue management through psychiatry  Obtain Genesight swab and patient will notify us to proceed  Generally covered by medicaid but typically requires 2 failed medications and she only has failed 1 thus far. She will investigate this.               History of Present Illness   Chief Complaint   Patient presents with   • Medication Management     Was on 2.5 for three days and depression was much worse and intrusive thoughts of hurting self. Unsure if should stop lexapro or continue was told by psych to continue      HPI Patient started Lexapro 2.5mg on Saturday evening. Last dose was Tuesday evening. Patient had worsening depression and intrusive thoughts about self harm. Also felt excessively tired, brain fog, moving in slow motion. Was unable to drive or walk long distances. She decided to stop the medication. She was in contact with her psychiatry office who had instructed her to continue and that side effects would likely get better. She does feel like her mental clarity has improved since stopping it.  She is understandably hesitant regarding trying another medication. She notes that she had similar reaction to birth  control previously.     Review of Systems   Constitutional:  Positive for fatigue. Negative for chills and fever.   HENT:  Negative for congestion and sore throat.    Eyes:  Negative for pain and visual disturbance.   Respiratory:  Negative for cough and shortness of breath.    Cardiovascular:  Negative for chest pain and palpitations.   Gastrointestinal:  Negative for abdominal pain and nausea.   Genitourinary:  Negative for dysuria.   Musculoskeletal:  Negative for arthralgias and myalgias.   Skin:  Negative for rash and wound.   Neurological:   Positive for light-headedness. Negative for dizziness and headaches.   Psychiatric/Behavioral:  Positive for decreased concentration, dysphoric mood, sleep disturbance and suicidal ideas. Negative for self-injury. The patient is nervous/anxious.    All other systems reviewed and are negative.      Objective   /76   Pulse 91   Temp 97.5 °F (36.4 °C)   Ht 5' (1.524 m)   Wt 57.6 kg (127 lb)   LMP 03/14/2024   SpO2 98%   BMI 24.80 kg/m²      Physical Exam  Vitals and nursing note reviewed.   Constitutional:       General: She is not in acute distress.     Appearance: She is well-developed.   HENT:      Head: Normocephalic and atraumatic.      Right Ear: External ear normal.      Left Ear: External ear normal.      Nose: Nose normal.   Eyes:      Conjunctiva/sclera: Conjunctivae normal.   Neck:      Trachea: No tracheal deviation.   Pulmonary:      Effort: Pulmonary effort is normal.   Abdominal:      Tenderness: There is no abdominal tenderness.   Skin:     General: Skin is warm and dry.      Capillary Refill: Capillary refill takes less than 2 seconds.      Findings: No rash.   Neurological:      Mental Status: She is alert.      Cranial Nerves: No cranial nerve deficit.   Psychiatric:      Comments: Denies suicidal plan or intent. Has had suicidal ideation.

## 2025-01-17 ENCOUNTER — CONSULT (OUTPATIENT)
Dept: CARDIOLOGY CLINIC | Facility: CLINIC | Age: 35
End: 2025-01-17
Payer: COMMERCIAL

## 2025-01-17 VITALS
SYSTOLIC BLOOD PRESSURE: 130 MMHG | WEIGHT: 127.3 LBS | HEART RATE: 81 BPM | BODY MASS INDEX: 24.99 KG/M2 | HEIGHT: 60 IN | DIASTOLIC BLOOD PRESSURE: 60 MMHG

## 2025-01-17 DIAGNOSIS — R00.2 PALPITATIONS: ICD-10-CM

## 2025-01-17 DIAGNOSIS — R42 POSTURAL DIZZINESS: ICD-10-CM

## 2025-01-17 PROCEDURE — 93000 ELECTROCARDIOGRAM COMPLETE: CPT | Performed by: INTERNAL MEDICINE

## 2025-01-17 PROCEDURE — 99243 OFF/OP CNSLTJ NEW/EST LOW 30: CPT | Performed by: INTERNAL MEDICINE

## 2025-01-17 NOTE — PROGRESS NOTES
Cardiology Clinic Visit Note  Taylor John 34 y.o. adult   MRN: [id number]    Assessment and Plan      Diagnoses and all orders for this visit:    Palpitations  48-hour Holter monitor revealed sinus rhythm with average heart rate of 88 bpm and rare PACs.  Symptoms and events during monitoring correlated with sinus rhythm.  Normal TSH, normal hemoglobin in 3/2024.  EKG today is normal.  Reassured the patient.   -     Ambulatory Referral to Cardiology  -     POCT ECG  -     Tilt table; Future  -     Echo complete w/ contrast if indicated; Future    Postural dizziness  Orthostatic vital signs personally checked, BP sitting 125/70, standing 120/68. Patient was symptomatic with change of position.  Fortunately no syncope or falls.  Symptoms might be attributed to vasovagal or orthostatic dizziness or POTS.  Will obtain echocardiogram and tilt table test for further evaluation.    -     Ambulatory Referral to Cardiology  -     Tilt table; Future  -     Echo complete w/ contrast if indicated; Future        Schedule a follow-up appointment in 6 months.     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     The patient has past medical history of anxiety, fibromyalgia and uterine fibroids status post hysterectomy.   Taylor reports longstanding history of approximately 10 years of dizziness.  Noticed that it got worse since having COVID in 2021 and after hysterectomy in March 2024.  It usually occurs when standing up from seated position but sometimes will have a whole dizzy day.  The episode of dizziness is described as vision going dark, getting wobbly until grabbing on something to prevent falls.  Those episodes are associated with mild nausea and brief palpitations.  They last for about a minute or so. No history of syncope  or falls. No associated chest pain, shortness of breath or other cardiopulmonary symptoms with the episodes.  Patient tried lifestyle modifications in form of standing up slowly from seated position, raising the legs for some time before standing, hydration with addition of electrolytes and taking some salt which helps.    Taylor does not smoke, drink alcohol or use illicit drugs.  Family history significant for A-fib and grandmother.  Grandmother also had issues with dizziness.      Previous Cardiac Workup:    EKG  Normal sinus rhythm with ventricular rate of 81 bpm.  Normal EKG    TREADMILL STRESS  No results found for this or any previous visit.     ----------------------------------------------------------------------------------------------  NUCLEAR STRESS TEST: No results found for this or any previous visit.    No results found for this or any previous visit.      --------------------------------------------------------------------------------  CATH:  No results found for this or any previous visit.    --------------------------------------------------------------------------------  ECHO:   No results found for this or any previous visit.    No results found for this or any previous visit.    --------------------------------------------------------------------------------  HOLTER  INDICATIONS: palpitations     DESCRIPTION OF FINDINGS:  The patient was monitored for a total of 48 hours.  The patient was predominantly noted to be in sinus throughout the study.  The average heart rate was 88 beats per minute.  The heart rate ranged from a low of 59 beats per minute in sinus cassie at 4am to a maximum of 141 beats per minute in sinus tachycardia at 1045am.     Ventricular ectopic activity consisted of 0 beats.  There was no sustained or nonsustained ventricular tachycardia.     Supraventricular ectopic activity consisted of 22 pac's. There was no supraventricular tachycardia identified.  There was no evidence of  atrial fibrillation or atrial flutter.     There were no significant pauses. The longest R-R interval was 1.3 seconds.  There was no evidence of advanced degree heart block.     The accompanying patient diary notes symptoms of fluttering. Correlation with the tracings at these times reveals sinus rhythm.     IMPRESSION:  Sinus throughout with rare PAC's.  Symptoms correlated with Sinus rhythm.    --------------------------------------------------------------------------------  CAROTIDS  No results found for this or any previous visit.       ---------------------------------------------------------------------------------  Review of Systems   Respiratory:  Negative for shortness of breath.    Cardiovascular:  Positive for palpitations. Negative for chest pain and leg swelling.   Neurological:  Positive for dizziness and light-headedness. Negative for syncope.         Current Outpatient Medications:     cetirizine (ZyrTEC) 10 mg tablet, Take 10 mg by mouth daily, Disp: , Rfl:     famotidine (PEPCID) 20 mg tablet, Take 1 tablet (20 mg total) by mouth 2 (two) times a day, Disp: 180 tablet, Rfl: 3    Ibuprofen (Advil) 200 MG CAPS, Take by mouth, Disp: , Rfl:     levalbuterol (XOPENEX HFA) 45 mcg/act inhaler, Inhale 1-2 puffs every 8 (eight) hours as needed for wheezing, Disp: 15 g, Rfl: 5    LORazepam (ATIVAN) 1 mg tablet, Take 1 tablet (1 mg total) by mouth every 6 (six) hours as needed for anxiety, Disp: 8 tablet, Rfl: 0    Triamcinolone Acetonide (Nasacort Allergy 24HR) 55 MCG/ACT nasal spray, by Each Nare route daily, Disp: , Rfl:     amphetamine-dextroamphetamine (ADDERALL XR, 5MG,) 5 MG 24 hr capsule, Take 1 capsule (5 mg total) by mouth every morning Max Daily Amount: 5 mg (Patient not taking: Reported on 11/13/2024), Disp: 30 capsule, Rfl: 0  Past Medical History:   Diagnosis Date    Allergic     I always feel allergic to something, but I haven't been tested for anything specific    Anxiety     I'm not sure that  I've ever not had anxiety    Asthma     Maybe? Everyone in my family has it, and I'm curious if I do too    Depression     Probably on and off. I've never been diagnosed    Eating disorder     In high school I had disordered eating because of a lot of pain and anxiety, but it was never diagnosed as an eating disorder    Fibroid 01/01/2016    Not sure when they were found but it was sometime after 2016    GERD (gastroesophageal reflux disease)     Headache(784.0)     Headaches happen every once in awhile, mostly around my period    Motion sickness     PONV (postoperative nausea and vomiting)     Visual impairment     I wear glasses, and have since I was 7     Past Surgical History:   Procedure Laterality Date    EGD      EXAMINATION UNDER ANESTHESIA N/A 03/21/2024    Procedure: EXAM UNDER ANESTHESIA (EUA);  Surgeon: Stella Yu MD;  [facility]: [facility];  Service: Gynecology Oncology    HYSTERECTOMY  March 2024    NV COLPOPERINEORRHAPHY SUTURE INJ VAGINA&/PERINEU N/A 03/21/2024    Procedure: REPAIR LACERATION PERINEAL / VAGINAL 3CM PERIURETHRAL, 3CM POSTERIOR FORCHETTE 2ND DEGREE;  Surgeon: Stella Yu MD;  Location[facility];  Service: Gynecology Oncology    NV LAPS TOTAL HYSTERECT 250 GM/< W/RMVL TUBE/OVARY N/A 03/21/2024    Procedure: MODIFIED RADICAL HYSTERECTOMY LAPAROSCOPIC TOTAL (LTH) W/ ROBOTICS, BILATERAL SALPINGECTOMY, UTERUS >250G, COMPLETE URETEROLYSIS BILATERALLY;  Surgeon: Stella Yu MD;  Location: [facility];  Service: Gynecology Oncology     Social History     Socioeconomic History    Marital status:      Spouse name: Not on file    Number of children: Not on file    Years of education: Not on file    Highest education level: Not on file   Occupational History    Not on file   Tobacco Use    Smoking status: Never    Smokeless tobacco: Never   Vaping Use    Vaping status: Never Used   Substance and Sexual Activity    Alcohol use: Never    Drug use: Never    Sexual activity: Not  Currently     Partners: Female     Birth control/protection: None     Comment: male partner in the past, but not currently, and never again   Other Topics Concern    Not on file   Social History Narrative        What type of home do you live in: Single house    Age of your home: 21 yrs    How long have you been living there: 14 yrs on, off    Type of heat: Forced hot air    Type of fuel: Electric    What type of tevin is in your bedroom: Carpet    Do you have the following in or near your home:    Air products: Central air    Pests: None    Pets: 2Dogs    Basement: Dry, Unfinished, and Crawl space    Exposure to second hand smoke: No     Social Drivers of Health     Financial Resource Strain: Not on file   Food Insecurity: Not on file   Transportation Needs: Not on file   Physical Activity: Insufficiently Active (7/25/2024)    Exercise Vital Sign     Days of Exercise per Week: 2 days     Minutes of Exercise per Session: 20 min   Stress: Not on file   Social Connections: Not on file   Intimate Partner Violence: Not on file   Housing Stability: Not on file     Family History   Problem Relation Age of Onset    Asthma Mother     Depression Mother         I don't know if she was every diagnosed, but she definitely has seemed depressed a lot    Learning disabilities Mother         never diagnosed, but she suspects she has dsylexia    ADD / ADHD Mother         highly suspected but not diagnosed    Anxiety disorder Mother         not diagnosed but very anxious    OCD Mother         not diagnosed but highly suspected    Anxiety disorder Father         not diagnosed but very anxious    Hypertension Father     Asthma Brother     Anxiety disorder Brother         Was treated for anxiety several years ago but is doing much better now and not on meds    Asthma Maternal Grandmother     Ovarian cancer Maternal Grandmother     Heart failure Maternal Grandmother     Rashes / Skin problems Maternal Grandmother     Stroke Maternal  Grandmother     Cancer Maternal Grandmother         ovarian cancer    Anxiety disorder Maternal Grandmother     Cancer Paternal Grandfather      Allergies   Allergen Reactions    Eggs Or Egg-Derived Products - Food Allergy Throat Swelling    Albuterol Palpitations    Gluten Meal - Food Allergy Abdominal Pain, Anxiety, Dizziness, Fatigue, GI Intolerance and Headache       Objective     Vitals:    01/17/25 1308   BP: 130/60   BP Location: Left arm   Patient Position: Sitting   Cuff Size: Standard   Pulse: 81   Weight: 57.7 kg (127 lb 4.8 oz)   Height: 5' (1.524 m)       Physical exam:     Physical Exam  Vitals and nursing note reviewed.   Constitutional:       General: She is not in acute distress.  HENT:      Head: Normocephalic and atraumatic.   Cardiovascular:      Rate and Rhythm: Normal rate.      Heart sounds: Normal heart sounds. No murmur heard.  Pulmonary:      Effort: No respiratory distress.      Breath sounds: Normal breath sounds. No wheezing.   Skin:     General: Skin is warm.   Neurological:      Mental Status: She is alert.         Amara Mcnally MD  Cardiology fellow-FY1  ==  PLEASE NOTE:  This encounter was completed utilizing the [employer] Direct Speech Voice Recognition Software. Grammatical errors, random word insertions, pronoun errors and incomplete sentences are occasional consequences of the system due to software limitations, ambient noise and hardware issues.These may be missed by proof reading prior to affixing electronic signature. Any questions or concerns about the content, text or information contained within the body of this dictation should be directly addressed to the physician for clarification. Please do not hesitate to call me directly if you have any any questions or concerns.    Acute Inpatient Rehab/Sub-acute Rehab

## 2025-01-20 ENCOUNTER — OFFICE VISIT (OUTPATIENT)
Dept: PHYSICAL THERAPY | Facility: CLINIC | Age: 35
End: 2025-01-20
Payer: COMMERCIAL

## 2025-01-20 DIAGNOSIS — M79.7 FIBROMYALGIA: Primary | ICD-10-CM

## 2025-01-20 PROCEDURE — 97110 THERAPEUTIC EXERCISES: CPT

## 2025-01-20 PROCEDURE — 97112 NEUROMUSCULAR REEDUCATION: CPT

## 2025-01-20 NOTE — PROGRESS NOTES
"Daily Note     Today's date: 2025  Patient name: Taylor John  : 1990  MRN: [id number]  Referring provider: Chino Cardona*  Dx:   Encounter Diagnosis     ICD-10-CM    1. Fibromyalgia  M79.7           Start Time: 1553  Stop Time: 1637  Total time in clinic (min): 44 minutes    Subjective: Pt reports she felt good after last session. She also had her blood work done and notes that there was no abnormalities.       Objective: See treatment diary below      Assessment: Tolerated treatment well. Patient demonstrated fatigue post treatment, exhibited good technique with therapeutic exercises, and would benefit from continued PT. Pt did well today with doing more of her exercises and reported overall less feeling of being dizzy despite reporting she has had a rough last few days.      Plan: Continue per plan of care.  Progress treatment as tolerated.       Precautions: Fibromyalgia, anxiety    1:1 [id number]  Manuals 12/10 1/9 1/20                                                              Neuro Re-Ed             Scap squeeze 3\"x10 3\"x10 3\"x10          rows             Chin tucks             Shoulder rolls  Fw/bw 10x  Fw/bw 10x          Cervical flexion  10x 10x & ext ea                                    Ther Ex             UBE             TM             NuStep  L2 7.5' L2 15'          Pball rollout (F/R/L)  3\"x10 ea 3'X10 ea                                                              Ther Activity             Pt edu: involved anatomy, physio, HEP, POC, movement patterns  RE                         Gait Training                                       Modalities                                              "

## 2025-01-27 ENCOUNTER — OFFICE VISIT (OUTPATIENT)
Dept: PHYSICAL THERAPY | Facility: CLINIC | Age: 35
End: 2025-01-27
Payer: COMMERCIAL

## 2025-01-27 DIAGNOSIS — M79.7 FIBROMYALGIA: Primary | ICD-10-CM

## 2025-01-27 PROCEDURE — 97112 NEUROMUSCULAR REEDUCATION: CPT

## 2025-01-27 PROCEDURE — 97110 THERAPEUTIC EXERCISES: CPT

## 2025-01-27 NOTE — PROGRESS NOTES
"Daily Note     Today's date: 2025  Patient name: Taylor John  : 1990  MRN: [id number]  Referring provider: Cihno Cardona*  Dx:   Encounter Diagnosis     ICD-10-CM    1. Fibromyalgia  M79.7           Start Time: 1605  Stop Time: 1639  Total time in clinic (min): 34 minutes    Subjective: Pt presents reporting she has been getting less dizziness and that today seems like a better day despite having done hours of work yesterday.      Objective: See treatment diary below      Assessment: Tolerated treatment fair. Patient demonstrated fatigue post treatment, exhibited good technique with therapeutic exercises, and would benefit from continued PT. Reviewed doing the pball rollouts at home without a ball since that does seem to be helping her in terms of reducing discomfort. Also encouraged pt to be conscious of her posture as she tends to round shoulder and PPT increasing stress on low and upper back.      Plan: Continue per plan of care.  Progress treatment as tolerated.       Precautions: Fibromyalgia, anxiety    1:1 [id number]  Manuals 12/10 1/9 1/20 1/27                                                             Neuro Re-Ed             Scap squeeze 3\"x10 3\"x10 3\"x10 3\"x20         rows             Chin tucks             Shoulder rolls  Fw/bw 10x  Fw/bw 10x Fw/bw 10x         Cervical flexion  10x 10x & ext ea                                    Ther Ex             UBE             TM             NuStep  L2 7.5' L2 15' L2 10'         Pball rollout (F/R/L)  3\"x10 ea 3\"x10 ea 3\"x10         HS S seated    10\"x4                                                 Ther Activity             Pt edu: involved anatomy, physio, HEP, POC, movement patterns  RE                         Gait Training                                       Modalities                                                "

## 2025-01-29 ENCOUNTER — APPOINTMENT (OUTPATIENT)
Dept: PHYSICAL THERAPY | Facility: CLINIC | Age: 35
End: 2025-01-29
Payer: COMMERCIAL

## 2025-02-03 ENCOUNTER — OFFICE VISIT (OUTPATIENT)
Dept: PHYSICAL THERAPY | Facility: CLINIC | Age: 35
End: 2025-02-03
Payer: COMMERCIAL

## 2025-02-03 DIAGNOSIS — M79.7 FIBROMYALGIA: Primary | ICD-10-CM

## 2025-02-03 PROCEDURE — 97112 NEUROMUSCULAR REEDUCATION: CPT

## 2025-02-03 PROCEDURE — 97110 THERAPEUTIC EXERCISES: CPT

## 2025-02-03 PROCEDURE — 97530 THERAPEUTIC ACTIVITIES: CPT

## 2025-02-03 NOTE — PROGRESS NOTES
Progress Note    Today's date: 2/3/2025  Patient name: Taylor John  : 1990  MRN: [id number]  Referring provider: Chino Cardona*  Dx:   Encounter Diagnosis     ICD-10-CM    1. Fibromyalgia  M79.7           Start Time: 1540  Stop Time: 1620  Total time in clinic (min): 40 minutes    Subjective: The patient presents for re-evaluation today. The patient reports improvement in symptoms since beginning skilled physical therapy. The patient reports 4/10 pain at it's worst over the past 24 hours, and reports 30% improvement in overall condition since beginning formal PT care. The patient's chief remaining concern is pain.     Objective: See treatment diary below    Active Range of Motion   Cervical/Thoracic Spine     Normal active range of motion  Left Shoulder   Normal active range of motion    Right Shoulder   Normal active range of motion    Left Elbow   Normal active range of motion  Elbow hyperextension    Right Elbow   Normal active range of motion  Elbow hyperextension    Functional Assessment   5x STS 17.63 seconds    Squat    Left valgus, left tibial anterior translation beyond toes, right valgus and right tibial anterior translation beyond toes.     global LE & UE strength of 3+/5    Assessment: Taylor John is a pleasant 34 y.o. adult who has been receiving PT intervention for fibromyalgia and general strengthening. The patient has demonstrated decreased pain, increased strength, increased ROM, improved body mechanics, improved posture , and increased activity tolerance since beginning treatment. The pt has been able to tolerate more exercises in session lately and her overall posture and activity level tolerance has improved as well. PT will continue to work towards improving overall strength and functional activity tolerance.    Primary remaining impairments include:    1)  functional activity tolerance    2)  strength & reported pain levels       Plan: Continue per plan of care.  Progress  "treatment as tolerated.  1x/wk for 8 wks     Precautions: Fibromyalgia, anxiety      Manuals 12/10 1/9 1/20 1/27 2/3                                                            Neuro Re-Ed             Scap squeeze 3\"x10 3\"x10 3\"x10 3\"x20 3\"x20        rows             Chin tucks             Shoulder rolls  Fw/bw 10x  Fw/bw 10x Fw/bw 10x         Cervical flexion  10x 10x & ext ea          Side step     2'        Hip abd & ext     10x ea        Ther Ex             UBE     L1 2'/2'        TM             NuStep  L2 7.5' L2 15' L2 10' L2 10'        Pball rollout (F/R/L)  3\"x10 ea 3\"x10 ea 3\"x10         HS S seated    10\"x4  10\"x4        HR     30x                                  Ther Activity             Pt edu: involved anatomy, physio, HEP, POC, movement patterns  RE    POC, HEP, outcome measures, ROM- RE                     Gait Training                                       Modalities                                                  "

## 2025-02-10 ENCOUNTER — OFFICE VISIT (OUTPATIENT)
Dept: PHYSICAL THERAPY | Facility: CLINIC | Age: 35
End: 2025-02-10
Payer: COMMERCIAL

## 2025-02-10 DIAGNOSIS — M79.7 FIBROMYALGIA: Primary | ICD-10-CM

## 2025-02-10 PROCEDURE — 97110 THERAPEUTIC EXERCISES: CPT

## 2025-02-10 PROCEDURE — 97112 NEUROMUSCULAR REEDUCATION: CPT

## 2025-02-10 NOTE — PROGRESS NOTES
"Daily Note     Today's date: 2/10/2025  Patient name: Taylor John  : 1990  MRN: [id number]  Referring provider: Chino Cardona*  Dx:   Encounter Diagnosis     ICD-10-CM    1. Fibromyalgia  M79.7           Start Time:   Stop Time: 1609  Total time in clinic (min): 32 minutes    Subjective: Pt reports she's feeling pretty good today.      Objective: See treatment diary below      Assessment: Tolerated treatment well. Patient demonstrated fatigue post treatment, exhibited good technique with therapeutic exercises, and would benefit from continued PT. Pt overall did extremely well today. She managed to do all of her exercises with less breaks required. She also noted she was feeling almost pain free today by the end of the session.      Plan: Continue per plan of care.  Progress treatment as tolerated.       Precautions: Fibromyalgia, anxiety    1:1 [id number]  Manuals 12/10 1/9 1/20 1/27 2/3 2/10                                                           Neuro Re-Ed             Scap squeeze 3\"x10 3\"x10 3\"x10 3\"x20 3\"x20 3\"X10       rows             Chin tucks             Shoulder rolls  Fw/bw 10x  Fw/bw 10x Fw/bw 10x         Cervical flexion  10x 10x & ext ea          Side step     2'        Hip abd & ext     10x ea 2x10 ea       Ther Ex             UBE     L1 2'/2'        TM             NuStep  L2 7.5' L2 15' L2 10' L2 10' L2 10'       Pball rollout (F/R/L)  3\"x10 ea 3\"x10 ea 3\"x10  3\"X10       HS S seated    10\"x4  10\"x4        HR     30x                                  Ther Activity             Pt edu: involved anatomy, physio, HEP, POC, movement patterns  RE    POC, HEP, outcome measures, ROM- RE                     Gait Training                                       Modalities                                                    "

## 2025-02-17 ENCOUNTER — OFFICE VISIT (OUTPATIENT)
Dept: PHYSICAL THERAPY | Facility: CLINIC | Age: 35
End: 2025-02-17
Payer: COMMERCIAL

## 2025-02-17 DIAGNOSIS — M79.7 FIBROMYALGIA: Primary | ICD-10-CM

## 2025-02-17 PROCEDURE — 97112 NEUROMUSCULAR REEDUCATION: CPT

## 2025-02-17 PROCEDURE — 97110 THERAPEUTIC EXERCISES: CPT

## 2025-02-17 NOTE — PROGRESS NOTES
"Daily Note     Today's date: 2025  Patient name: Taylor John  : 1990  MRN: [id number]  Referring provider: Chino Cardona*  Dx:   Encounter Diagnosis     ICD-10-CM    1. Fibromyalgia  M79.7           Start Time: 1425  Stop Time: 1505  Total time in clinic (min): 40 minutes    Subjective: Pt reports that this week isn't as good for her but that she did try some of her exercises this weekend since she was feeling a little stir crazy.      Objective: See treatment diary below      Assessment: Tolerated treatment fair. Patient demonstrated fatigue post treatment, exhibited good technique with therapeutic exercises, and would benefit from continued PT. Pt tolerated treatment pretty well today. Pt is requiring less breaks overall. Pt was able to maintain the same level of activity today as she was last session despite arriving with more pain compared to last session. Discussed with the pt how this is a sig improvement and that we will continue to progress as she is able. Pt did mention she is supposed to start Aderol per psych however has not yet started.      Plan: Continue per plan of care.  Progress treatment as tolerated.       Precautions: Fibromyalgia, anxiety      Manuals 12/10 1/9 1/20 1/27 2/3 2/10 2/17                                                          Neuro Re-Ed             Scap squeeze 3\"x10 3\"x10 3\"x10 3\"x20 3\"x20 3\"X10 3\"x10      rows             Chin tucks             Shoulder rolls  Fw/bw 10x  Fw/bw 10x Fw/bw 10x         Cervical flexion  10x 10x & ext ea          Side step     2'  2'      Hip abd & ext     10x ea 2x10 ea 2x10 ea      Ther Ex             UBE     L1 2'/2'        TM             NuStep  L2 7.5' L2 15' L2 10' L2 10' L2 10' L3 12'      Pball rollout (F/R/L)  3\"x10 ea 3\"x10 ea 3\"x10  3\"X10 3\"x10 ea      HS S seated    10\"x4  10\"x4        HR     30x  30x      Biceps curls       2# 2x10                   Ther Activity             Pt edu: involved anatomy, physio, " HEP, POC, movement patterns  RE    POC, HEP, outcome measures, ROM- RE                     Gait Training                                       Modalities

## 2025-02-26 ENCOUNTER — APPOINTMENT (OUTPATIENT)
Dept: PHYSICAL THERAPY | Facility: CLINIC | Age: 35
End: 2025-02-26
Payer: COMMERCIAL

## 2025-02-26 ENCOUNTER — OFFICE VISIT (OUTPATIENT)
Dept: OBGYN CLINIC | Facility: CLINIC | Age: 35
End: 2025-02-26
Payer: COMMERCIAL

## 2025-02-26 VITALS
BODY MASS INDEX: 24.54 KG/M2 | HEIGHT: 60 IN | DIASTOLIC BLOOD PRESSURE: 60 MMHG | SYSTOLIC BLOOD PRESSURE: 118 MMHG | WEIGHT: 125 LBS

## 2025-02-26 DIAGNOSIS — F32.81 PMDD (PREMENSTRUAL DYSPHORIC DISORDER): Primary | ICD-10-CM

## 2025-02-26 PROCEDURE — 99215 OFFICE O/P EST HI 40 MIN: CPT | Performed by: OBSTETRICS & GYNECOLOGY

## 2025-02-26 NOTE — Clinical Note
Went into detail in the note regarding dysphoria and gender neutral identity. Considering use of testosterone to reduce ovulatory function and reduce the dysphoria that is accompanied by ovulation. I explained I would reach out regarding a referral to you for further discussion or if you recommended a dosage of testosterone that may be well tolerated and helpful. Let me know if a referral is recommended due to the discussion of monitoring levels or side effects that should be discussed with Taylor. Thanks for your help in advance.

## 2025-02-27 PROBLEM — F32.81 PMDD (PREMENSTRUAL DYSPHORIC DISORDER): Status: ACTIVE | Noted: 2025-02-27

## 2025-02-27 NOTE — ASSESSMENT & PLAN NOTE
Discussed with patient concern for premenstrual dysphoric disorder with patients cyclic depression and suicidal ideations occurring each month. They are no longer able to track their bleeding after their hysterectomy but the cycles are recurrent and monthly. Due to past experience and increase in their suicidal ideations with progesterone only birth control, they are very wary of starting hormonal medication. We discussed other medications that can provide ovulatory suppression that are utilized for endometriosis management, but can also provide some relief from PMDD and reduce the fluctuations of the menstrual hormones occurring each cycle. Taylor is also wary of Orilissa or Lupron due to the possible increased suicidal ideation risk as well.   We have discussed Taylor's gender identity as being gender neutral and they express that each time they feel these cycle changes and depressive low symptoms it overall is very dysphoric as well. They expressed an interest/inquiry into whether testosterone may provide some ovulatory suppression. They are not interested in transgender transition but rather a reduction in female menstruation in hopes it will suppress some dysphoria.     Plan to reach out to Dr. Galindo who specializes in transgender care regarding options for usage of testosterone, recommendations for dosage or referral to her care for management of testosterone and discussion of side effects for patient.

## 2025-02-27 NOTE — PROGRESS NOTES
Name: Taylor John      : 1990      MRN: [id number]  Encounter Provider: Alejandra Hebert MD  Encounter Date: 2025   Encounter department: [facility]  :  Assessment & Plan  PMDD (premenstrual dysphoric disorder)  Discussed with patient concern for premenstrual dysphoric disorder with patients cyclic depression and suicidal ideations occurring each month. They are no longer able to track their bleeding after their hysterectomy but the cycles are recurrent and monthly. Due to past experience and increase in their suicidal ideations with progesterone only birth control, they are very wary of starting hormonal medication. We discussed other medications that can provide ovulatory suppression that are utilized for endometriosis management, but can also provide some relief from PMDD and reduce the fluctuations of the menstrual hormones occurring each cycle. Taylor is also wary of Orilissa or Lupron due to the possible increased suicidal ideation risk as well.   We have discussed Blacks gender identity as being gender neutral and they express that each time they feel these cycle changes and depressive low symptoms it overall is very dysphoric as well. They expressed an interest/inquiry into whether testosterone may provide some ovulatory suppression. They are not interested in transgender transition but rather a reduction in female menstruation in hopes it will suppress some dysphoria.     Plan to reach out to Dr. Galindo who specializes in transgender care regarding options for usage of testosterone, recommendations for dosage or referral to her care for management of testosterone and discussion of side effects for patient.            History of Present Illness   HPI  Taylor John is a 34 y.o. adult who presents for discussion of dysphoria and symptoms occurring cyclically since their hysterectomy occurred about a year ago. They report that once a month they experience  "increased dizziness, lightheadedness, orthostatic hypotension episodes, and increased dysphoria that can be severe with suicidal ideations occurring.   Taylor had a hysterectomy due to an enlarged uterus and pelvic bulk symptoms. Since the hysterectomy, their pelvic pain is improved, but the dysphoria that has also accompanied their menstrual cycles is still present and increasing.   Prior attempts to control Taylor's symptoms including pelvic pain and suppression of their menstrual cycle was attempted with progesterone only OCPs. Unfortunately Taylor had a significant increase in suicidal ideation occur with initiation of this medication. Taylor is wary of starting medications such as Orilissa which can also provide menstrual suppression due to risk of increased suicidal ideation as they deal with cycles of dysphoria already. They express interest and inquired if utilization of testosterone may be of benefit. They are not interested in transgender transition but rather remaining in a gender neutral space. They admit to a feeling of \"ickiness\" when it feels as though they are ovulating which prompts further dysphoria.   History obtained from: patient    Review of Systems   Constitutional:  Negative for activity change, appetite change and unexpected weight change.   Cardiovascular:  Negative for chest pain.   Gastrointestinal:  Negative for abdominal pain.   Genitourinary:  Positive for menstrual problem and pelvic pain.   Neurological:  Positive for dizziness, weakness and light-headedness.   Psychiatric/Behavioral:  Positive for decreased concentration, dysphoric mood and suicidal ideas. Negative for confusion.      Medical History Reviewed by provider this encounter:     .     Objective   /60   Ht 5' (1.524 m)   Wt 56.7 kg (125 lb)   LMP 03/14/2024   BMI 24.41 kg/m²      Physical Exam  Constitutional:       General: She is not in acute distress.     Appearance: Normal appearance. She is not diaphoretic. "   HENT:      Head: Normocephalic and atraumatic.   Pulmonary:      Effort: Pulmonary effort is normal. No respiratory distress.   Musculoskeletal:      Right lower leg: No edema.      Left lower leg: No edema.   Neurological:      Mental Status: She is alert and oriented to person, place, and time.   Skin:     General: Skin is warm and dry.      Coloration: Skin is not pale.   Psychiatric:         Mood and Affect: Mood normal.         Behavior: Behavior normal.         Thought Content: Thought content normal.         Judgment: Judgment normal.   Vitals and nursing note reviewed.           Administrative Statements   I have spent a total time of 45 minutes in caring for this patient on the day of the visit/encounter including Prognosis, Risks and benefits of tx options, Instructions for management, Patient and family education, Risk factor reductions, Impressions, Counseling / Coordination of care, Documenting in the medical record, Reviewing/placing orders in the medical record (including tests, medications, and/or procedures), Obtaining or reviewing history  , and Communicating with other healthcare professionals .

## 2025-03-03 ENCOUNTER — OFFICE VISIT (OUTPATIENT)
Dept: PHYSICAL THERAPY | Facility: CLINIC | Age: 35
End: 2025-03-03
Payer: COMMERCIAL

## 2025-03-03 DIAGNOSIS — M79.7 FIBROMYALGIA: Primary | ICD-10-CM

## 2025-03-03 PROCEDURE — 97110 THERAPEUTIC EXERCISES: CPT

## 2025-03-03 PROCEDURE — 97112 NEUROMUSCULAR REEDUCATION: CPT

## 2025-03-03 NOTE — PROGRESS NOTES
"Daily Note     Today's date: 3/3/2025  Patient name: Taylor John  : 1990  MRN: [id number]  Referring provider: Chino Cardona*  Dx:   Encounter Diagnosis     ICD-10-CM    1. Fibromyalgia  M79.7           Start Time: 1444  Stop Time: 1517  Total time in clinic (min): 33 minutes    Subjective: Pt arrives reporting that she is uncomfortable and sore all over.      Objective: See treatment diary below      Assessment: Tolerated treatment fair. Patient demonstrated fatigue post treatment, exhibited good technique with therapeutic exercises, and would benefit from continued PT. Encouraged pt to do some of the exercises today. She was able to complete some of her program and noted no increase in pain. Advised her to do what she can, especially on day's she is feeling worse.      Plan: Continue per plan of care.  Progress treatment as tolerated.       Precautions: Fibromyalgia, anxiety    1:1 [id number]  Manuals 12/10 1/9 1/20 1/27 2/3 2/10 2/17 3/3                                                         Neuro Re-Ed             Scap squeeze 3\"x10 3\"x10 3\"x10 3\"x20 3\"x20 3\"X10 3\"x10 3\"x10     rows             Chin tucks             Shoulder rolls  Fw/bw 10x  Fw/bw 10x Fw/bw 10x    10x ea     Cervical flexion  10x 10x & ext ea          Side step     2'  2'      Hip abd & ext     10x ea 2x10 ea 2x10 ea 2x10 e     Ther Ex             UBE     L1 2'/2'   L1 2'/2'     TM             NuStep  L2 7.5' L2 15' L2 10' L2 10' L2 10' L3 12' Bike L1 7'     Pball rollout (F/R/L)  3\"x10 ea 3\"x10 ea 3\"x10  3\"X10 3\"x10 ea 3\"x10     HS S seated    10\"x4  10\"x4        HR     30x  30x 20x     Biceps curls       2# 2x10                   Ther Activity             Pt edu: involved anatomy, physio, HEP, POC, movement patterns  RE    POC, HEP, outcome measures, ROM- RE                     Gait Training                                       Modalities                                              "

## 2025-03-10 ENCOUNTER — APPOINTMENT (OUTPATIENT)
Dept: PHYSICAL THERAPY | Facility: CLINIC | Age: 35
End: 2025-03-10
Payer: COMMERCIAL

## 2025-03-17 ENCOUNTER — OFFICE VISIT (OUTPATIENT)
Dept: PHYSICAL THERAPY | Facility: CLINIC | Age: 35
End: 2025-03-17
Payer: COMMERCIAL

## 2025-03-17 DIAGNOSIS — M79.7 FIBROMYALGIA: Primary | ICD-10-CM

## 2025-03-17 PROCEDURE — 97110 THERAPEUTIC EXERCISES: CPT

## 2025-03-17 PROCEDURE — 97112 NEUROMUSCULAR REEDUCATION: CPT

## 2025-03-17 NOTE — PROGRESS NOTES
"Daily Note     Today's date: 3/17/2025  Patient name: Taylor John  : 1990  MRN: [id number]  Referring provider: Chino Cardona*  Dx:   Encounter Diagnosis     ICD-10-CM    1. Fibromyalgia  M79.7           Start Time: 1452  Stop Time: 1532  Total time in clinic (min): 40 minutes    Subjective: Pt reports she started her small dose of Aderol and that she took it for 2 days prior to taking today off. She noted it didn't have any sig side effects but that she feels like she overdid it since she was feeling so good when on it.      Objective: See treatment diary below      Assessment: Tolerated treatment well. Patient demonstrated fatigue post treatment, exhibited good technique with therapeutic exercises, and would benefit from continued PT. Patient did very well today despite starting her new medication and having not been here in almost 2 weeks. Pt is progressing well and will look to add additional UE and LE exercise at next session per tolerance.      Plan: Continue per plan of care.  Progress treatment as tolerated.       Precautions: Fibromyalgia, anxiety      Manuals 12/10 1/9 1/20 1/27 2/3 2/10 2/17 3/3 3/17                                                        Neuro Re-Ed             Scap squeeze 3\"x10 3\"x10 3\"x10 3\"x20 3\"x20 3\"X10 3\"x10 3\"x10 3\"x15    rows             Chin tucks             Shoulder rolls  Fw/bw 10x  Fw/bw 10x Fw/bw 10x    10x ea Fw/bw 10x ea    Cervical flexion  10x 10x & ext ea          Side step     2'  2'      Hip abd & ext     10x ea 2x10 ea 2x10 ea 2x10 e 2x15 e    Ther Ex             UBE     L1 2'/2'   L1 2'/2' L1 2'/2'    TM             NuStep  L2 7.5' L2 15' L2 10' L2 10' L2 10' L3 12' Bike L1 7' L3 10'    Pball rollout (F/R/L)  3\"x10 ea 3\"x10 ea 3\"x10  3\"X10 3\"x10 ea 3\"x10 3\"X10    HS S seated    10\"x4  10\"x4        HR     30x  30x 20x 30x    Biceps curls       2# 2x10                   Ther Activity             Pt edu: involved anatomy, physio, HEP, POC, movement " patterns  RE    POC, HEP, outcome measures, ROM- RE                     Gait Training                                       Modalities

## 2025-03-20 ENCOUNTER — TELEPHONE (OUTPATIENT)
Dept: OBGYN CLINIC | Facility: CLINIC | Age: 35
End: 2025-03-20

## 2025-03-20 NOTE — TELEPHONE ENCOUNTER
----- Message from Berta Munguia MD sent at 3/18/2025  3:24 PM EDT -----  Regarding: NP appt  Please contact [facility] to offer an appointment to discuss testosterone. Thanks!  ----- Message -----  From: Alejandra Hebert MD  Sent: 3/10/2025  11:13 PM EDT  To: Berta Munguia MD    Thanks Berta. I was on vacation last week, thus the delay in response.   Knowing Taylor, I think a thorough discussion with you regarding side effects and the consent form is the most ideal since she has had significant side effects with medication in the past. Walking through some more common side effects and those that are worrisome is likely to be more beneficial for her care.    I'll send her a message regarding coordination of care and let her know that you are out of the office currently but someone should be reaching out to her soon.   -Lum  ----- Message -----  From: Berta Munguia MD  Sent: 2/28/2025   3:42 PM EDT  To: Alejandra Hebert MD    Hi! Your note is great! It is certainly worth a trial of testosterone, but I would want to be sure that they are comfortable with all of the possible side effects. Ovulation suppression is very likely once above female physiologic range but we should discuss the other side effects. They would need to sign the [facility] consent form (I can send the reference number via email if necessary) and I would recommend use of T-gel given that we are likely hopeful for low dosing. Side effects can be found in the [employer] Standards of Care version 8 in the appendix (free online). If you would prefer, I will happily set them up with my office to get the consents signed and get them started and then transition them back to you or if you feel comfortable, it can be started by you in the office. What would be the easiest?  ----- Message -----  From: Alejandra Hebert MD  Sent: 2/27/2025  11:38 PM  EST  To: Berta Munguia MD    Went into detail in the note regarding dysphoria and gender neutral identity. Considering use of testosterone to reduce ovulatory function and reduce the dysphoria that is accompanied by ovulation. I explained I would reach out regarding a referral to you for further discussion or if you recommended a dosage of testosterone that may be well tolerated and helpful. Let me know if a referral is recommended due to the discussion of monitoring levels or side effects that should be discussed with Taylor. Thanks for your help in advance.

## 2025-03-24 ENCOUNTER — APPOINTMENT (OUTPATIENT)
Dept: PHYSICAL THERAPY | Facility: CLINIC | Age: 35
End: 2025-03-24
Payer: COMMERCIAL

## 2025-03-31 ENCOUNTER — OFFICE VISIT (OUTPATIENT)
Dept: PHYSICAL THERAPY | Facility: CLINIC | Age: 35
End: 2025-03-31
Payer: COMMERCIAL

## 2025-03-31 DIAGNOSIS — M79.7 FIBROMYALGIA: Primary | ICD-10-CM

## 2025-03-31 PROCEDURE — 97112 NEUROMUSCULAR REEDUCATION: CPT

## 2025-03-31 PROCEDURE — 97110 THERAPEUTIC EXERCISES: CPT

## 2025-03-31 NOTE — PROGRESS NOTES
"Daily Note     Today's date: 3/31/2025  Patient name: Taylor John  : 1990  MRN: [id number]  Referring provider: Chino Cardona*  Dx:   Encounter Diagnosis     ICD-10-CM    1. Fibromyalgia  M79.7           Start Time: 1445  Stop Time: 1526  Total time in clinic (min): 41 minutes    Subjective: Pt notes that she has stopped taking the Aderol due to increased anxiety and that she overall is doing better but today with the weather changes its effecting her neck.      Objective: See treatment diary below      Assessment: Tolerated treatment fair. Patient demonstrated fatigue post treatment, exhibited good technique with therapeutic exercises, and would benefit from continued PT. Pt did well today with increases where able. Advised the pt to straighten her toe forward vs letting the LE go into ER with hip abd lifts to reduce any hip pinching she was getting.       Plan: Continue per plan of care.  Progress treatment as tolerated.       Precautions: Fibromyalgia, anxiety      Manuals   1/20 1/27 2/3 2/10 2/17 3/3 3/17 3/31                                                       Neuro Re-Ed             Scap squeeze   3\"x10 3\"x20 3\"x20 3\"X10 3\"x10 3\"x10 3\"x15 3\"x20   rows             Chin tucks             Shoulder rolls   Fw/bw 10x Fw/bw 10x    10x ea Fw/bw 10x ea Fw/bw 10x ea   Cervical flexion   10x & ext ea          Side step     2'  2'      Hip abd & ext     10x ea 2x10 ea 2x10 ea 2x10 e 2x15 e 2x15 ea   Ther Ex             UBE     L1 2'/2'   L1 2'/2' L1 2'/2' L1 3'/3'   TM             NuStep   L2 15' L2 10' L2 10' L2 10' L3 12' Bike L1 7' L3 10' L3 9'   Pball rollout (F/R/L)   3\"x10 ea 3\"x10  3\"X10 3\"x10 ea 3\"x10 3\"X10 3\"x10   HS S seated    10\"x4  10\"x4        HR     30x  30x 20x 30x 30x   Biceps curls       2# 2x10                   Ther Activity             Pt edu: involved anatomy, physio, HEP, POC, movement patterns      POC, HEP, outcome measures, ROM- RE                     Gait Training         "                               Modalities

## 2025-04-07 ENCOUNTER — OFFICE VISIT (OUTPATIENT)
Dept: PHYSICAL THERAPY | Facility: CLINIC | Age: 35
End: 2025-04-07
Payer: COMMERCIAL

## 2025-04-07 DIAGNOSIS — M79.7 FIBROMYALGIA: Primary | ICD-10-CM

## 2025-04-07 PROCEDURE — 97112 NEUROMUSCULAR REEDUCATION: CPT

## 2025-04-07 PROCEDURE — 97110 THERAPEUTIC EXERCISES: CPT

## 2025-04-07 NOTE — PROGRESS NOTES
"Daily Note     Today's date: 2025  Patient name: Taylor John  : 1990  MRN: [id number]  Referring provider: Chino Cardona*  Dx:   Encounter Diagnosis     ICD-10-CM    1. Fibromyalgia  M79.7           Start Time: 1541  Stop Time: 1616  Total time in clinic (min): 35 minutes    Subjective: Pt reports her neck is a bit sore but otherwise she has overall been feeling good.       Objective: See treatment diary below      Assessment: Tolerated treatment well. Patient demonstrated fatigue post treatment, exhibited good technique with therapeutic exercises, and would benefit from continued PT. Added some seated STM of the UT secondary to feeling a lot of discomfort on the R side in the last few days. Pt did really well today despite feeling uncomfortable in the neck upon arrival. Will continue to progress as able.      Plan: Continue per plan of care.  Progress treatment as tolerated.       Precautions: Fibromyalgia, anxiety      Manuals 4/7   1/27 2/3 2/10 2/17 3/3 3/17 3/31   UT STM RE                                                   Neuro Re-Ed             Scap squeeze 3\"x20   3\"x20 3\"x20 3\"X10 3\"x10 3\"x10 3\"x15 3\"x20   rows             Chin tucks             Shoulder rolls Fw/bw 10x ea   Fw/bw 10x    10x ea Fw/bw 10x ea Fw/bw 10x ea   Cervical flexion             Side step     2'  2'      Hip abd & ext 2x15 e    10x ea 2x10 ea 2x10 ea 2x10 e 2x15 e 2x15 ea   Ther Ex             UBE     L1 2'/2'   L1 2'/2' L1 2'/2' L1 3'/3'   TM             NuStep L3 8'   L2 10' L2 10' L2 10' L3 12' Bike L1 7' L3 10' L3 9'   Pball rollout (F/R/L) 5\"x10 ea   3\"x10  3\"X10 3\"x10 ea 3\"x10 3\"X10 3\"x10   HS S seated    10\"x4  10\"x4        HR 30x    30x  30x 20x 30x 30x   Biceps curls       2# 2x10                   Ther Activity             Pt edu: involved anatomy, physio, HEP, POC, movement patterns      POC, HEP, outcome measures, ROM- RE                     Gait Training                                     "   Modalities

## 2025-04-11 ENCOUNTER — PATIENT MESSAGE (OUTPATIENT)
Dept: FAMILY MEDICINE CLINIC | Facility: CLINIC | Age: 35
End: 2025-04-11

## 2025-04-11 DIAGNOSIS — R05.3 PERSISTENT COUGH: ICD-10-CM

## 2025-04-14 ENCOUNTER — OFFICE VISIT (OUTPATIENT)
Dept: PHYSICAL THERAPY | Facility: CLINIC | Age: 35
End: 2025-04-14
Payer: COMMERCIAL

## 2025-04-14 DIAGNOSIS — M79.7 FIBROMYALGIA: Primary | ICD-10-CM

## 2025-04-14 PROCEDURE — 97140 MANUAL THERAPY 1/> REGIONS: CPT

## 2025-04-14 PROCEDURE — 97110 THERAPEUTIC EXERCISES: CPT

## 2025-04-14 PROCEDURE — 97112 NEUROMUSCULAR REEDUCATION: CPT

## 2025-04-14 RX ORDER — FAMOTIDINE 20 MG/1
40 TABLET, FILM COATED ORAL 2 TIMES DAILY
Qty: 200 TABLET | Refills: 3 | Status: SHIPPED | OUTPATIENT
Start: 2025-04-14

## 2025-04-14 NOTE — PROGRESS NOTES
"Daily Note     Today's date: 2025  Patient name: Taylor John  : 1990  MRN: [id number]  Referring provider: Chino Cardona*  Dx:   Encounter Diagnosis     ICD-10-CM    1. Fibromyalgia  M79.7           Start Time: 1445  Stop Time: 1530  Total time in clinic (min): 45 minutes    Subjective: Pt notes she's doing okay but has some neck pain and her rib is bothering her.      Objective: See treatment diary below      Assessment: Tolerated treatment well. Patient demonstrated fatigue post treatment, exhibited good technique with therapeutic exercises, and would benefit from continued PT. Pt noted no neck pain at the end of the session.       Plan: Continue per plan of care.  Progress treatment as tolerated.       Precautions: Fibromyalgia, anxiety      Manuals 4/7 4/14   2/3 2/10 2/17 3/3 3/17 3/31   UT STM RE RE                                                  Neuro Re-Ed             Scap squeeze 3\"x20 3\"x20   3\"x20 3\"X10 3\"x10 3\"x10 3\"x15 3\"x20   rows             Chin tucks             Shoulder rolls Fw/bw 10x ea Fw/bw 10xea      10x ea Fw/bw 10x ea Fw/bw 10x ea   Cervical flexion             Side step     2'  2'      Hip abd & ext 2x15 e 2x15 e   10x ea 2x10 ea 2x10 ea 2x10 e 2x15 e 2x15 ea   Ther Ex             UBE     L1 2'/2'   L1 2'/2' L1 2'/2' L1 3'/3'   TM             NuStep L3 8' L3 8'   L2 10' L2 10' L3 12' Bike L1 7' L3 10' L3 9'   Pball rollout (F/R/L) 5\"x10 ea 5\"x10 ea    3\"X10 3\"x10 ea 3\"x10 3\"X10 3\"x10   HS S seated     10\"x4        HR 30x 30x   30x  30x 20x 30x 30x   Biceps curls       2# 2x10                   Ther Activity             Pt edu: involved anatomy, physio, HEP, POC, movement patterns      POC, HEP, outcome measures, ROM- RE                     Gait Training                                       Modalities                                                "

## 2025-04-21 ENCOUNTER — APPOINTMENT (OUTPATIENT)
Dept: PHYSICAL THERAPY | Facility: CLINIC | Age: 35
End: 2025-04-21
Payer: COMMERCIAL

## 2025-04-28 ENCOUNTER — APPOINTMENT (OUTPATIENT)
Dept: PHYSICAL THERAPY | Facility: CLINIC | Age: 35
End: 2025-04-28
Payer: COMMERCIAL

## 2025-05-01 ENCOUNTER — PATIENT MESSAGE (OUTPATIENT)
Dept: FAMILY MEDICINE CLINIC | Facility: CLINIC | Age: 35
End: 2025-05-01

## 2025-05-01 DIAGNOSIS — R05.3 PERSISTENT COUGH: ICD-10-CM

## 2025-05-01 RX ORDER — FAMOTIDINE 20 MG/1
20 TABLET, FILM COATED ORAL 2 TIMES DAILY
Qty: 180 TABLET | Refills: 1 | Status: SHIPPED | OUTPATIENT
Start: 2025-05-01

## 2025-05-06 ENCOUNTER — ANNUAL EXAM (OUTPATIENT)
Dept: OBGYN CLINIC | Facility: CLINIC | Age: 35
End: 2025-05-06
Payer: COMMERCIAL

## 2025-05-06 ENCOUNTER — OFFICE VISIT (OUTPATIENT)
Dept: PHYSICAL THERAPY | Facility: CLINIC | Age: 35
End: 2025-05-06
Payer: COMMERCIAL

## 2025-05-06 VITALS
SYSTOLIC BLOOD PRESSURE: 120 MMHG | BODY MASS INDEX: 24.15 KG/M2 | WEIGHT: 123 LBS | HEIGHT: 60 IN | DIASTOLIC BLOOD PRESSURE: 80 MMHG

## 2025-05-06 DIAGNOSIS — Z01.419 WELL WOMAN EXAM WITH ROUTINE GYNECOLOGICAL EXAM: Primary | ICD-10-CM

## 2025-05-06 DIAGNOSIS — R10.2 PELVIC PAIN: ICD-10-CM

## 2025-05-06 DIAGNOSIS — M62.89 PELVIC FLOOR DYSFUNCTION: ICD-10-CM

## 2025-05-06 DIAGNOSIS — M79.7 FIBROMYALGIA: Primary | ICD-10-CM

## 2025-05-06 PROCEDURE — 97110 THERAPEUTIC EXERCISES: CPT | Performed by: PHYSICAL THERAPIST

## 2025-05-06 PROCEDURE — 97110 THERAPEUTIC EXERCISES: CPT

## 2025-05-06 PROCEDURE — 97112 NEUROMUSCULAR REEDUCATION: CPT | Performed by: PHYSICAL THERAPIST

## 2025-05-06 PROCEDURE — 99395 PREV VISIT EST AGE 18-39: CPT | Performed by: OBSTETRICS & GYNECOLOGY

## 2025-05-06 PROCEDURE — 97112 NEUROMUSCULAR REEDUCATION: CPT

## 2025-05-06 RX ORDER — FAMOTIDINE 40 MG/1
1 TABLET, FILM COATED ORAL 2 TIMES DAILY
COMMUNITY
Start: 2025-04-18

## 2025-05-06 NOTE — PROGRESS NOTES
"Daily Note     Today's date: 2025  Patient name: Taylor John  : 1990  MRN: [id number]  Referring provider: Chino Cardona*  Dx:   Encounter Diagnosis     ICD-10-CM    1. Fibromyalgia  M79.7           Start Time: 1530  Stop Time: 1612  Total time in clinic (min): 42 minutes    Subjective: Pt presents reporting that she got a massage yesterday but she thinks it didn't help and maybe made her feel a little worse.      Objective: See treatment diary below      Assessment: Tolerated treatment well. Patient demonstrated fatigue post treatment, exhibited good technique with therapeutic exercises, and would benefit from continued PT. Added standing iso pulldowns for core activation a well as bridges. Pt struggled with pulldowns unsure of how to pull down with her arms while they were extended requiring several VC.      Plan: Continue per plan of care.  Progress treatment as tolerated.       Precautions: Fibromyalgia, anxiety      Manuals 4/7 4/14 5/6   2/10 2/17 3/3 3/17 3/31   UT STM RE RE                                                  Neuro Re-Ed             Scap squeeze 3\"x20 3\"x20 3\"x20   3\"X10 3\"x10 3\"x10 3\"x15 3\"x20   rows             Chin tucks             Shoulder rolls Fw/bw 10x ea Fw/bw 10xea      10x ea Fw/bw 10x ea Fw/bw 10x ea   Stand iso pulldown   5\"x2x10          Side step       2'      Hip abd & ext 2x15 e 2x15 e 2x10 e   2x10 ea 2x10 ea 2x10 e 2x15 e 2x15 ea   Ther Ex             UBE        L1 2'/2' L1 2'/2' L1 3'/3'   bridges   3\"x20          NuStep L3 8' L3 8' L3 10'   L2 10' L3 12' Bike L1 7' L3 10' L3 9'   Pball rollout (F/R/L) 5\"x10 ea 5\"x10 ea 5\"X10 ea   3\"X10 3\"x10 ea 3\"x10 3\"X10 3\"x10   HS S seated             HR 30x 30x 30x    30x 20x 30x 30x   Biceps curls       2# 2x10                   Ther Activity             Pt edu: involved anatomy, physio, HEP, POC, movement patterns                           Gait Training                                       Modalities           "

## 2025-05-06 NOTE — PROGRESS NOTES
ASSESSMENT & PLAN:   Diagnoses and all orders for this visit:    Well woman exam with routine gynecological exam    Pelvic pain  -     Ambulatory Referral to Physical Therapy; Future    Pelvic floor dysfunction  -     Ambulatory Referral to Physical Therapy; Future    Other orders  -     famotidine (PEPCID) 40 MG tablet; Take 1 tablet by mouth 2 (two) times a day (Patient taking differently: Take 1 tablet by mouth in the morning)        The following were reviewed in today's visit: ASCCP guidelines, Gardasil vaccination, STD testing breast self exam, exercise, healthy diet, and pelvic pain.    Patient to return to office in yearly for annual exam.     All questions have been answered to her satisfaction.        CC:  Annual Gynecologic Examination  Chief Complaint   Patient presents with    Gynecologic Exam     The patient is here for a yearly exam. TLH with Dr. Yu   Pap normal HPV neg 24.   Pap 23 wnl,  +HPV other HR, repeat in 1 year     No bleeding. The patient has some pelvic cramping in the middle. Sometimes she has cramping in her hip. No urinary issues.   The patient has breast tenderness. No breast lumps or nipple redness.   No vaginal issues.          HPI: Taylor John is a 34 y.o.  who presents for annual gynecologic examination.  She has the following concerns:  still having chronic pelvic pain. States that it feels as though they are constantly tensed and contracted in their pelvis. They have an appointment with Dr. Munguia to discuss possible use of testosterone for menstrual suppression. Use of hormonal medication in the past has increased suicidal ideation.       Health Maintenance:    Exercise: infrequently  Breast exams/breast awareness: yes    Past Medical History:   Diagnosis Date    Allergic     I always feel allergic to something, but I haven't been tested for anything specific    Anxiety     I'm not sure that I've ever not had anxiety    Asthma     Maybe? Everyone in  my family has it, and I'm curious if I do too    Depression     Probably on and off. I've never been diagnosed    Eating disorder     In high school I had disordered eating because of a lot of pain and anxiety, but it was never diagnosed as an eating disorder    Fibroid 01/01/2016    Not sure when they were found but it was sometime after 2016    GERD (gastroesophageal reflux disease)     Headache(784.0)     Headaches happen every once in awhile, mostly around my period    Motion sickness     PONV (postoperative nausea and vomiting)     Visual impairment     I wear glasses, and have since I was 7       Past Surgical History:   Procedure Laterality Date    EGD      EXAMINATION UNDER ANESTHESIA N/A 03/21/2024    Procedure: EXAM UNDER ANESTHESIA (EUA);  Surgeon: Stella Yu MD;  [facility];  Service: Gynecology Oncology    HYSTERECTOMY  March 2024    MA COLPOPERINEORRHAPHY SUTURE INJ VAGINA&/PERINEU N/A 03/21/2024    Procedure: REPAIR LACERATION PERINEAL / VAGINAL 3CM PERIURETHRAL, 3CM POSTERIOR FORCHETTE 2ND DEGREE;  Surgeon: Stella Yu MD;  [facility];  Service: Gynecology Oncology    MA LAPS TOTAL HYSTERECT 250 GM/< W/RMVL TUBE/OVARY N/A 03/21/2024    Procedure: MODIFIED RADICAL HYSTERECTOMY LAPAROSCOPIC TOTAL (LTH) W/ ROBOTICS, BILATERAL SALPINGECTOMY, UTERUS >250G, COMPLETE URETEROLYSIS BILATERALLY;  Surgeon: Stella Yu MD;  [facility];  Service: Gynecology Oncology       Past OB/Gyn History:   Patient's last menstrual period was 03/14/2024.    Last Pap: 2024 : no abnormalities  History of abnormal Pap smear: yes - as above  HPV vaccine completed: no    Patient is currently sexually active.   STD testing: no  Current contraception:same sex relationship - contraception not needed      Family History  Family History   Problem Relation Age of Onset    Asthma Mother     Depression Mother         I don't know if she was every diagnosed, but she definitely has seemed depressed a lot     Learning disabilities Mother         never diagnosed, but she suspects she has dsylexia    ADD / ADHD Mother         highly suspected but not diagnosed    Anxiety disorder Mother         not diagnosed but very anxious    OCD Mother         not diagnosed but highly suspected    Anxiety disorder Father         not diagnosed but very anxious    Hypertension Father     Asthma Brother     Anxiety disorder Brother         Was treated for anxiety several years ago but is doing much better now and not on meds    Asthma Brother     Asthma Maternal Grandmother     Ovarian cancer Maternal Grandmother     Heart failure Maternal Grandmother     Rashes / Skin problems Maternal Grandmother     Stroke Maternal Grandmother     Cancer Maternal Grandmother         ovarian cancer    Anxiety disorder Maternal Grandmother     Cancer Paternal Grandfather     Breast cancer Paternal Aunt        Family history of uterine or ovarian cancer: no  Family history of breast cancer: no  Family history of colon cancer: no    Social History:  Social History     Socioeconomic History    Marital status:      Spouse name: Not on file    Number of children: Not on file    Years of education: Not on file    Highest education level: Not on file   Occupational History    Not on file   Tobacco Use    Smoking status: Never    Smokeless tobacco: Never   Vaping Use    Vaping status: Never Used   Substance and Sexual Activity    Alcohol use: Never    Drug use: Never    Sexual activity: Not Currently     Partners: Female, Male     Birth control/protection: Abstinence, Condom Male     Comment: male partner in the past, but not currently, and never again   Other Topics Concern    Not on file   Social History Narrative        What type of home do you live in: Single house    Age of your home: 21 yrs    How long have you been living there: 14 yrs on, off    Type of heat: Forced hot air    Type of fuel: Electric    What type of tevin is in your bedroom:  Carpet    Do you have the following in or near your home:    Air products: Central air    Pests: None    Pets: 2Dogs    Basement: Dry, Unfinished, and Crawl space    Exposure to second hand smoke: No     Social Drivers of Health     Financial Resource Strain: Not on file   Food Insecurity: Not on file   Transportation Needs: Not on file   Physical Activity: Insufficiently Active (7/25/2024)    Exercise Vital Sign     Days of Exercise per Week: 2 days     Minutes of Exercise per Session: 20 min   Stress: Not on file   Social Connections: Not on file   Intimate Partner Violence: Not on file   Housing Stability: Not on file     Domestic violence screen: negative    Allergies:  Allergies   Allergen Reactions    Eggs Or Egg-Derived Products - Food Allergy Throat Swelling    Albuterol Palpitations    Gluten Meal - Food Allergy Abdominal Pain, Anxiety, Dizziness, Fatigue, GI Intolerance and Headache       Medications:    Current Outpatient Medications:     cetirizine (ZyrTEC) 10 mg tablet, Take 10 mg by mouth daily, Disp: , Rfl:     famotidine (PEPCID) 20 mg tablet, Take 1 tablet (20 mg total) by mouth 2 (two) times a day, Disp: 180 tablet, Rfl: 1    famotidine (PEPCID) 40 MG tablet, Take 1 tablet by mouth 2 (two) times a day (Patient taking differently: Take 1 tablet by mouth in the morning), Disp: , Rfl:     Ibuprofen (Advil) 200 MG CAPS, Take by mouth, Disp: , Rfl:     levalbuterol (XOPENEX HFA) 45 mcg/act inhaler, Inhale 1-2 puffs every 8 (eight) hours as needed for wheezing, Disp: 15 g, Rfl: 5    LORazepam (ATIVAN) 1 mg tablet, Take 1 tablet (1 mg total) by mouth every 6 (six) hours as needed for anxiety, Disp: 8 tablet, Rfl: 0    Triamcinolone Acetonide (Nasacort Allergy 24HR) 55 MCG/ACT nasal spray, by Each Nare route daily, Disp: , Rfl:     amphetamine-dextroamphetamine (ADDERALL XR, 5MG,) 5 MG 24 hr capsule, Take 1 capsule (5 mg total) by mouth every morning Max Daily Amount: 5 mg (Patient not taking: Reported on  11/13/2024), Disp: 30 capsule, Rfl: 0    Review of Systems:  Review of Systems   Constitutional:  Negative for activity change, appetite change and unexpected weight change.   Respiratory:  Negative for cough and shortness of breath.    Cardiovascular:  Negative for chest pain.   Gastrointestinal:  Negative for abdominal pain, constipation, diarrhea, nausea and vomiting.   Genitourinary:  Positive for pelvic pain and vaginal pain. Negative for difficulty urinating, dyspareunia, frequency, menstrual problem, urgency, vaginal bleeding and vaginal discharge.   Musculoskeletal:  Positive for back pain and myalgias.   Skin: Negative.    Neurological:  Negative for dizziness, weakness, light-headedness and headaches.   Psychiatric/Behavioral: Negative.           Physical Exam:  /80   Ht 5' (1.524 m)   Wt 55.8 kg (123 lb)   LMP 03/14/2024   BMI 24.02 kg/m²    Physical Exam  Constitutional:       General: She is not in acute distress.     Appearance: Normal appearance. She is well-developed. She is not diaphoretic.   Genitourinary:      Vulva and bladder normal.      No lesions in the vagina.      Genitourinary Comments: Perineum normal in appearance, no lacerations, no ulcerations, no lesions visualized.      Right Labia: No rash, tenderness or lesions.     Left Labia: No tenderness, lesions or rash.     No inguinal adenopathy present in the right or left side.     Vaginal cuff intact.     Perineal sutures intact.     No vaginal discharge, erythema, tenderness or bleeding.      No vaginal prolapse present.     No vaginal atrophy present.       Right Adnexa: not tender, not full and no mass present.     Left Adnexa: not tender, not full and no mass present.     Cervix is absent.      No parametrium nodularity or thickening present.     Uterus is absent.      No urethral prolapse or mass present.      Bladder is not tender.       Pelvic exam was performed with patient in the lithotomy position.   Rectum:      No  tenderness or external hemorrhoid.   Breasts:     Breasts are symmetrical.      Right: No swelling, bleeding, mass, skin change or tenderness.      Left: No swelling, bleeding, mass, skin change or tenderness.   HENT:      Head: Normocephalic and atraumatic.   Neck:      Thyroid: No thyromegaly or thyroid tenderness.   Cardiovascular:      Rate and Rhythm: Normal rate and regular rhythm.      Heart sounds: Normal heart sounds. No murmur heard.     No friction rub.   Pulmonary:      Effort: Pulmonary effort is normal. No respiratory distress.      Breath sounds: Normal breath sounds. No wheezing or rales.   Abdominal:      Palpations: Abdomen is soft. There is no mass.      Tenderness: There is no abdominal tenderness. There is no guarding.   Musculoskeletal:         General: No tenderness. Normal range of motion.      Right lower leg: No edema.      Left lower leg: No edema.   Lymphadenopathy:      Lower Body: No right inguinal adenopathy. No left inguinal adenopathy.   Neurological:      Mental Status: She is alert and oriented to person, place, and time.   Skin:     General: Skin is warm and dry.      Coloration: Skin is not pale.      Findings: No erythema.   Psychiatric:         Mood and Affect: Mood normal.         Behavior: Behavior normal.         Thought Content: Thought content normal.         Judgment: Judgment normal.   Vitals and nursing note reviewed.

## 2025-05-12 ENCOUNTER — APPOINTMENT (OUTPATIENT)
Dept: PHYSICAL THERAPY | Facility: CLINIC | Age: 35
End: 2025-05-12
Payer: COMMERCIAL

## 2025-05-23 ENCOUNTER — OFFICE VISIT (OUTPATIENT)
Age: 35
End: 2025-05-23
Payer: COMMERCIAL

## 2025-05-23 VITALS
SYSTOLIC BLOOD PRESSURE: 130 MMHG | BODY MASS INDEX: 24.35 KG/M2 | DIASTOLIC BLOOD PRESSURE: 82 MMHG | HEIGHT: 60 IN | WEIGHT: 124 LBS

## 2025-05-23 DIAGNOSIS — E34.9 HORMONE IMBALANCE: Primary | ICD-10-CM

## 2025-05-23 PROCEDURE — 99214 OFFICE O/P EST MOD 30 MIN: CPT | Performed by: OBSTETRICS & GYNECOLOGY

## 2025-05-23 NOTE — PROGRESS NOTES
"Subjective:     Taylor John is a 34 y.o.  agender/ gender fluid patient  who presents to discuss gender affirming hormone therapy, pelvic pain, cyclic emotional changes, and ovulation suppression surgery. Taylor uses they/she pronouns and feels comfortable with either today.     Taylor has been following Dr. Hebert for GYN care and is s/p RATLH with Dr. Yu in 3/2024 due to fibroid uterus. They were hopeful that hysterectomy would solve \"everything\". They report some improvement in their symptoms after hysterectomy including lack of menses. They report that their periods were awful and \"got rid of all of that pain\".     They are considering testosterone usage to reduce ovulatory function and reduce dysphoria that is accompanied by ovulation. They reports emotional dysregulation, pelvic pain, and nausea all of the time. They reports feeling really great one week and the the rests of the weeks of the month they feel awful. During their good week, they feel more social, feel like they can do thinks, feel \"just better\" and have less pain. The rest of the time they report \"body anxiety\". They state that it is not their brain that is anxious but rather it feels like their are bees under their skin. They feel dizzy, uncomfortable, and anxious. They are doing all of their coping mechanisms and they see their therapist weekly. Their therapist also notices the spikes too.      When patient had a tracking menstrual cycle (with bleeding prior to hysterectomy), their good week was during their luteal phase. They reports that during and especially the week after their period, they \"felt like death\". They have had a full autoimmune panel and did not finding anything and therefore their symptoms must be secondary to \"something else\". They have felt like this for basically their whole life.     They report a history of SI with progesterone. They had bad symptoms with both progesterone only and COCs. Their symptoms started " "abruptly within 3 weeks of starting hormone therapy. They also got the \"gender ick\" with birth control. Their hysterectomy felt really good.     They \"just want to feel more neutral\". They are not transmasculine at all but rather consider themselves neutral. They are not sexually active. They have been sexually active in the past. They are on the ace spectrum. They masturbate sometimes but not internally due to pain. They report vaginal dryness at baseline.    Patient Active Problem List   Diagnosis    ADHD (attention deficit hyperactivity disorder), combined type    Autism    Celiac disease    Seasonal asthma    Persistent cough    History of robot-assisted laparoscopic hysterectomy    Postop check    Dysuria    PMDD (premenstrual dysphoric disorder)       Past Medical History:   Diagnosis Date    Allergic     I always feel allergic to something, but I haven't been tested for anything specific    Anxiety     I'm not sure that I've ever not had anxiety    Asthma     Maybe? Everyone in my family has it, and I'm curious if I do too    Depression     Probably on and off. I've never been diagnosed    Eating disorder     In high school I had disordered eating because of a lot of pain and anxiety, but it was never diagnosed as an eating disorder    Fibroid 01/01/2016    Not sure when they were found but it was sometime after 2016    GERD (gastroesophageal reflux disease)     Headache(784.0)     Headaches happen every once in awhile, mostly around my period    Motion sickness     PONV (postoperative nausea and vomiting)     Visual impairment     I wear glasses, and have since I was 7       Past Surgical History:   Procedure Laterality Date    EGD      EXAMINATION UNDER ANESTHESIA N/A 03/21/2024    Procedure: EXAM UNDER ANESTHESIA (EUA);  Surgeon: Stella Yu MD;  [facility];  Service: Gynecology Oncology    HYSTERECTOMY  March 2024    MA COLPOPERINEORRHAPHY SUTURE INJ VAGINA&/PERINEU N/A 03/21/2024    " Procedure: REPAIR LACERATION PERINEAL / VAGINAL 3CM PERIURETHRAL, 3CM POSTERIOR FORCHETTE 2ND DEGREE;  Surgeon: Stella Yu MD;  [facility];  Service: Gynecology Oncology    AL LAPS TOTAL HYSTERECT 250 GM/< W/RMVL TUBE/OVARY N/A 03/21/2024    Procedure: MODIFIED RADICAL HYSTERECTOMY LAPAROSCOPIC TOTAL (LTH) W/ ROBOTICS, BILATERAL SALPINGECTOMY, UTERUS >250G, COMPLETE URETEROLYSIS BILATERALLY;  Surgeon: Stella Yu MD;  [facility];  Service: Gynecology Oncology       Objective:    Vitals: Blood pressure 130/82, height 5' (1.524 m), weight 56.2 kg (124 lb), last menstrual period 03/14/2024.Body mass index is 24.22 kg/m².    Physical Exam  Vitals reviewed.   Constitutional:       General: She is not in acute distress.     Appearance: Normal appearance. She is well-developed. She is not ill-appearing, toxic-appearing or diaphoretic.     Cardiovascular:      Rate and Rhythm: Normal rate.   Pulmonary:      Effort: Pulmonary effort is normal. No respiratory distress.     Skin:     General: Skin is warm and dry.     Neurological:      Mental Status: She is alert and oriented to person, place, and time.     Psychiatric:         Mood and Affect: Mood normal.         Behavior: Behavior normal.         Assessment & Plan  Hormone imbalance  We reviewed today as much of their history as possible including their gender identity and sexual orientation. We reviewed their concerns for cyclic pelvic pain, emotional changes, and inability to tolerate standard hormone therapy with progesterone and/or estrogen. They are s/p hysterectomy with some improvement but cycle symptoms have been persistent.     We reviewed today their concerns. We reviewed the possible treatments available. Testosterone therapy is best for transmasculine gender affirmation. Patient does not identify as transmasculine but would be interested in hormone therapy for ovulation suppression, improvement in neutral feeling/ appearance, and  possible improvement in cyclic symptoms.     We reviewed that some of the effects are irreversible and therefore, it is important that we are certain in their treatment willingness to accept the effects prior to starting therapy. We reviewed the effects of testosterone including changes in skin, hair, strength, fat distribution, voice, clitoral enlargement, and vaginal atrophy. We reviewed the risks of testosterone treatment including risk of polycythemia/ erythrocytosis and infertility.     For now, patient was encouraged to complete 2 sets of labs - one set during a good day/week and one set during a bad day/week to attempt to confirm when in the cycle patient is experiencing their most troubling symptoms and to complete baseline labs if they desire to move forward with testosterone treatment. Patient encouraged to continue routine GYN care with Dr. Hebert and continue to follow with their therapist. Will reach out to patient with lab results and organize follow up for initiation of testosterone therapy if desired.          Berta Munguia MD  OB/GYN  She/her  5/23/2025  3:29 PM

## 2025-05-23 NOTE — LETTER
"May 31, 2025     Alejandra Hebert MD  [address]  [address]  [facility] [address]    Patient: Taylor John   YOB: 1990   Date of Visit: 2025       Dear Dr. Alejandra Hebert MD:    Thank you for referring Taylor John to me for evaluation. Below are my notes for this consultation.    If you have questions, please do not hesitate to call me. I look forward to following your patient along with you.         Sincerely,        Berta Hunt MD        CC: No Recipients    Berta Hunt MD  2025  3:30 PM  Sign when Signing Visit  Subjective:     Taylor John is a 34 y.o.  agender/ gender fluid patient  who presents to discuss gender affirming hormone therapy, pelvic pain, cyclic emotional changes, and ovulation suppression surgery. Taylor uses they/she pronouns and feels comfortable with either today.     Taylor has been following Dr. Hebert for GYN care and is s/p RATLH with Dr. Yu in 3/2024 due to fibroid uterus. They were hopeful that hysterectomy would solve \"everything\". They report some improvement in their symptoms after hysterectomy including lack of menses. They report that their periods were awful and \"got rid of all of that pain\".     They are considering testosterone usage to reduce ovulatory function and reduce dysphoria that is accompanied by ovulation. They reports emotional dysregulation, pelvic pain, and nausea all of the time. They reports feeling really great one week and the the rests of the weeks of the month they feel awful. During their good week, they feel more social, feel like they can do thinks, feel \"just better\" and have less pain. The rest of the time they report \"body anxiety\". They state that it is not their brain that is anxious but rather it feels like their are bees under their skin. They feel dizzy, uncomfortable, and anxious. They are doing all of their coping mechanisms and they see their therapist weekly. Their " "therapist also notices the spikes too.      When patient had a tracking menstrual cycle (with bleeding prior to hysterectomy), their good week was during their luteal phase. They reports that during and especially the week after their period, they \"felt like death\". They have had a full autoimmune panel and did not finding anything and therefore their symptoms must be secondary to \"something else\". They have felt like this for basically their whole life.     They report a history of SI with progesterone. They had bad symptoms with both progesterone only and COCs. Their symptoms started abruptly within 3 weeks of starting hormone therapy. They also got the \"gender ick\" with birth control. Their hysterectomy felt really good.     They \"just want to feel more neutral\". They are not transmasculine at all but rather consider themselves neutral. They are not sexually active. They have been sexually active in the past. They are on the ace spectrum. They masturbate sometimes but not internally due to pain. They report vaginal dryness at baseline.    Patient Active Problem List   Diagnosis   • ADHD (attention deficit hyperactivity disorder), combined type   • Autism   • Celiac disease   • Seasonal asthma   • Persistent cough   • History of robot-assisted laparoscopic hysterectomy   • Postop check   • Dysuria   • PMDD (premenstrual dysphoric disorder)       Past Medical History:   Diagnosis Date   • Allergic     I always feel allergic to something, but I haven't been tested for anything specific   • Anxiety     I'm not sure that I've ever not had anxiety   • Asthma     Maybe? Everyone in my family has it, and I'm curious if I do too   • Depression     Probably on and off. I've never been diagnosed   • Eating disorder     In high school I had disordered eating because of a lot of pain and anxiety, but it was never diagnosed as an eating disorder   • Fibroid 01/01/2016    Not sure when they were found but it was sometime after " 2016   • GERD (gastroesophageal reflux disease)    • Headache(784.0)     Headaches happen every once in awhile, mostly around my period   • Motion sickness    • PONV (postoperative nausea and vomiting)    • Visual impairment     I wear glasses, and have since I was 7       Past Surgical History:   Procedure Laterality Date   • EGD     • EXAMINATION UNDER ANESTHESIA N/A 03/21/2024    Procedure: EXAM UNDER ANESTHESIA (EUA);  Surgeon: Stella Yu MD;  [facility];  Service: Gynecology Oncology   • HYSTERECTOMY  March 2024   • MT COLPOPERINEORRHAPHY SUTURE INJ VAGINA&/PERINEU N/A 03/21/2024    Procedure: REPAIR LACERATION PERINEAL / VAGINAL 3CM PERIURETHRAL, 3CM POSTERIOR FORCHETTE 2ND DEGREE;  Surgeon: Stella Yu MD;  [facility];  Service: Gynecology Oncology   • MT LAPS TOTAL HYSTERECT 250 GM/< W/RMVL TUBE/OVARY N/A 03/21/2024    Procedure: MODIFIED RADICAL HYSTERECTOMY LAPAROSCOPIC TOTAL (LTH) W/ ROBOTICS, BILATERAL SALPINGECTOMY, UTERUS >250G, COMPLETE URETEROLYSIS BILATERALLY;  Surgeon: Stella Yu MD;  [facility];  Service: Gynecology Oncology       Objective:    Vitals: Blood pressure 130/82, height 5' (1.524 m), weight 56.2 kg (124 lb), last menstrual period 03/14/2024.Body mass index is 24.22 kg/m².    Physical Exam  Vitals reviewed.   Constitutional:       General: She is not in acute distress.     Appearance: Normal appearance. She is well-developed. She is not ill-appearing, toxic-appearing or diaphoretic.     Cardiovascular:      Rate and Rhythm: Normal rate.   Pulmonary:      Effort: Pulmonary effort is normal. No respiratory distress.     Skin:     General: Skin is warm and dry.     Neurological:      Mental Status: She is alert and oriented to person, place, and time.     Psychiatric:         Mood and Affect: Mood normal.         Behavior: Behavior normal.         Assessment & Plan  Hormone imbalance  We reviewed today as much of their history as possible including  their gender identity and sexual orientation. We reviewed their concerns for cyclic pelvic pain, emotional changes, and inability to tolerate standard hormone therapy with progesterone and/or estrogen. They are s/p hysterectomy with some improvement but cycle symptoms have been persistent.     We reviewed today their concerns. We reviewed the possible treatments available. Testosterone therapy is best for transmasculine gender affirmation. Patient does not identify as transmasculine but would be interested in hormone therapy for ovulation suppression, improvement in neutral feeling/ appearance, and possible improvement in cyclic symptoms.     We reviewed that some of the effects are irreversible and therefore, it is important that we are certain in their treatment willingness to accept the effects prior to starting therapy. We reviewed the effects of testosterone including changes in skin, hair, strength, fat distribution, voice, clitoral enlargement, and vaginal atrophy. We reviewed the risks of testosterone treatment including risk of polycythemia/ erythrocytosis and infertility.     For now, patient was encouraged to complete 2 sets of labs - one set during a good day/week and one set during a bad day/week to attempt to confirm when in the cycle patient is experiencing their most troubling symptoms and to complete baseline labs if they desire to move forward with testosterone treatment. Patient encouraged to continue routine GYN care with Dr. Hebert and continue to follow with their therapist. Will reach out to patient with lab results and organize follow up for initiation of testosterone therapy if desired.          Berta Munguia MD  OB/GYN  She/her  5/23/2025  3:29 PM

## 2025-05-27 DIAGNOSIS — E34.9 HORMONE DISTURBANCE: ICD-10-CM

## 2025-05-27 DIAGNOSIS — R10.2 PELVIC PAIN: Primary | ICD-10-CM

## 2025-06-09 ENCOUNTER — EVALUATION (OUTPATIENT)
Dept: PHYSICAL THERAPY | Facility: CLINIC | Age: 35
End: 2025-06-09
Payer: COMMERCIAL

## 2025-06-09 DIAGNOSIS — M79.7 FIBROMYALGIA: Primary | ICD-10-CM

## 2025-06-09 PROCEDURE — 97110 THERAPEUTIC EXERCISES: CPT | Performed by: PHYSICAL THERAPIST

## 2025-06-09 PROCEDURE — 97112 NEUROMUSCULAR REEDUCATION: CPT | Performed by: PHYSICAL THERAPIST

## 2025-06-09 NOTE — PROGRESS NOTES
"Progress Note    Today's date: 2025  Patient name: Taylor John  : 1990  MRN: [id number]  Referring provider: Chino Cardona*  Dx:   Encounter Diagnosis     ICD-10-CM    1. Fibromyalgia  M79.7           Start Time: 154  Stop Time: 1619  Total time in clinic (min): 38 minutes    Subjective: The patient presents for re-evaluation today. The patient reports no change in symptoms since beginning skilled physical therapy. The patient reports 5/10 pain at it's worst over the past 24 hours. The patient's chief remaining concern is feeling more stable physically & increase endurance.     Objective: See treatment diary below    Active Range of Motion   Cervical/Thoracic Spine     Normal active range of motion  Left Shoulder   Normal active range of motion    Right Shoulder   Normal active range of motion    Left Elbow   Normal active range of motion  Elbow hyperextension    Right Elbow   Normal active range of motion  Elbow hyperextension    Squat    Left valgus, left tibial anterior translation beyond toes, right valgus and right tibial anterior translation beyond toes.     global LE & UE strength of 3+/5    Assessment: Taylor John is a pleasant 34 y.o. adult who has been receiving PT intervention for fibromyalgia. The patient has demonstrated improved posture  since beginning treatment. Pt reports that none of her exercises that she does at home consistently or regularly. Updated HEP and reviewed with her frequency and overall form. Pt will be d/c at this point in time while she works on HEP and will f/u in future as needed.        Plan: discharge from PT     Precautions: Fibromyalgia, anxiety      Manuals  5/6 6/9  2/10 2/17 3/3 3/17 3/31   UT STM RE RE                                                  Neuro Re-Ed             Scap squeeze 3\"x20 3\"x20 3\"x20 3\"x20  3\"X10 3\"x10 3\"x10 3\"x15 3\"x20   rows             Chin tucks             Shoulder rolls Fw/bw 10x ea Fw/bw 10xea      10x ea Fw/bw " "10x ea Fw/bw 10x ea   Stand iso pulldown   5\"x2x10          Side step       2'      Hip abd & ext 2x15 e 2x15 e 2x10 e 3x10 ea  2x10 ea 2x10 ea 2x10 e 2x15 e 2x15 ea   Ther Ex             UBE        L1 2'/2' L1 2'/2' L1 3'/3'   bridges   3\"x20          NuStep L3 8' L3 8' L3 10' L3 15'  L2 10' L3 12' Bike L1 7' L3 10' L3 9'   Pball rollout (F/R/L) 5\"x10 ea 5\"x10 ea 5\"X10 ea   3\"X10 3\"x10 ea 3\"x10 3\"X10 3\"x10   HS S seated             HR 30x 30x 30x 30x   30x 20x 30x 30x   Biceps curls    30x   2# 2x10                   Ther Activity             Pt edu: involved anatomy, physio, HEP, POC, movement patterns                           Gait Training                                       Modalities                                                    "

## 2025-06-09 NOTE — HOME EXERCISE EDUCATION
Program_ID:[id number]   Access Code: [email or web address]  URL: [email or web address]  Date: 06-  Prepared By: Venessa Trivedi    Program Notes      Exercises      - Seated Scapular Retraction - 1 x daily - 3-7 x weekly - 2 sets - 10 reps - 3 seconds hold      - Standing Hip Abduction with Counter Support - 1 x daily - 3-7 x weekly - 2-3 sets - 10 reps      - Standing Hip Extension - 1 x daily - 3-7 x weekly - 2-3 sets - 10 reps      - Heel Raise - 1 x daily - 3-7 x weekly - 3 sets - 10 reps      - Seated Biceps Curl - 1 x daily - 3-7 x weekly - 3 sets - 10 reps      - Supine Bridge - 1 x daily - 3-7 x weekly - 2 sets - 10 reps - 3 seconds hold

## 2025-06-10 ENCOUNTER — APPOINTMENT (OUTPATIENT)
Dept: LAB | Facility: MEDICAL CENTER | Age: 35
End: 2025-06-10
Attending: OBSTETRICS & GYNECOLOGY
Payer: COMMERCIAL

## 2025-06-10 DIAGNOSIS — E34.9 HORMONE DISTURBANCE: ICD-10-CM

## 2025-06-10 DIAGNOSIS — R10.2 PELVIC PAIN: ICD-10-CM

## 2025-06-10 LAB
ESTRADIOL SERPL-MCNC: 42.7 PG/ML
FSH SERPL-ACNC: 7.4 MIU/ML
LH SERPL-ACNC: 15.5 MIU/ML
TESTOST SERPL-MSCNC: 43 NG/DL

## 2025-06-10 PROCEDURE — 83002 ASSAY OF GONADOTROPIN (LH): CPT

## 2025-06-10 PROCEDURE — 36415 COLL VENOUS BLD VENIPUNCTURE: CPT

## 2025-06-10 PROCEDURE — 82670 ASSAY OF TOTAL ESTRADIOL: CPT

## 2025-06-10 PROCEDURE — 83001 ASSAY OF GONADOTROPIN (FSH): CPT

## 2025-06-10 PROCEDURE — 84403 ASSAY OF TOTAL TESTOSTERONE: CPT

## 2025-06-10 PROCEDURE — 84144 ASSAY OF PROGESTERONE: CPT

## 2025-06-11 ENCOUNTER — RESULTS FOLLOW-UP (OUTPATIENT)
Dept: OTHER | Facility: HOSPITAL | Age: 35
End: 2025-06-11

## 2025-06-11 DIAGNOSIS — E34.9 HORMONE DISTURBANCE: ICD-10-CM

## 2025-06-11 DIAGNOSIS — R10.2 PELVIC PAIN: Primary | ICD-10-CM

## 2025-06-11 LAB — PROGEST SERPL-MCNC: 0.23 NG/ML

## 2025-06-20 ENCOUNTER — APPOINTMENT (OUTPATIENT)
Dept: LAB | Facility: MEDICAL CENTER | Age: 35
End: 2025-06-20
Payer: COMMERCIAL

## 2025-06-20 DIAGNOSIS — E34.9 HORMONE DISTURBANCE: ICD-10-CM

## 2025-06-20 DIAGNOSIS — R10.2 PELVIC PAIN: ICD-10-CM

## 2025-06-20 LAB
ESTRADIOL SERPL-MCNC: 129.9 PG/ML
FSH SERPL-ACNC: 4 MIU/ML
LH SERPL-ACNC: 8.3 MIU/ML
PROGEST SERPL-MCNC: 7.78 NG/ML
TESTOST SERPL-MSCNC: 34 NG/DL

## 2025-06-20 PROCEDURE — 84403 ASSAY OF TOTAL TESTOSTERONE: CPT

## 2025-06-20 PROCEDURE — 36415 COLL VENOUS BLD VENIPUNCTURE: CPT

## 2025-06-20 PROCEDURE — 83002 ASSAY OF GONADOTROPIN (LH): CPT

## 2025-06-20 PROCEDURE — 84144 ASSAY OF PROGESTERONE: CPT

## 2025-06-20 PROCEDURE — 82670 ASSAY OF TOTAL ESTRADIOL: CPT

## 2025-06-20 PROCEDURE — 83001 ASSAY OF GONADOTROPIN (FSH): CPT

## 2025-06-20 NOTE — PROGRESS NOTES
PT Evaluation     Today's Date: 2025  Patient name: Taylor John  : 1990  MRN: [id number]  Referring provider: Alejandra Hebert  Dx:  Encounter Diagnosis     ICD-10-CM    1. Pelvic pain  R10.2 Ambulatory Referral to Physical Therapy      2. Pelvic floor dysfunction  M62.89 Ambulatory Referral to Physical Therapy                        Assessment/Plan    Assessment:     Taylor John is a 34 y.o.  adult patient seen for long standing pelvic pain s/p hysterectomy. Pelvic floor muscle exam deferred secondary to patient comfort, will assess next visit if pt amenable. Educated verbal cues for PFM relaxation vs PFMC and role w/ breathing. Given pt subjective anticipate findings consistent w/ mod/severe PFMD. These impairments limiting functional activities such as extended walking, extended standing, extended travel, exercise, lifting, increased pt distress, and impaired QOL. Taylor John is a good candidate for physical therapy and will benefit from skilled care to address impairments and achieve goals. POC: PFM coordination, PFM down training, dilator training, manual cueing, breathing mechanics, functional strengthening, abdominal strengthening, and PFM stretching.    During initial evaluation, education was provided on anatomy and function of the pelvic floor muscles and provided written and verbal consent for pelvic floor muscle exam. Patient also educated on diagnosis, plan of care and prognosis. Pt is in agreement with recommended plan of care and goals for therapy.    Goals:   Bladder:  In 5 weeks, patient will report ability to initiate stream of urine within 10 sec or less w/ no pushing to indicate improved functional relaxation of PFM.    Function:   In 5 weeks, patient will report at least 50% improvement in subjective sxs presentation since start of PT care.   In 10 weeks, patient will report at least 90% improvement in subjective sxs presentation since start of PT care.     Pain:  "  In 5 weeks, patient will report 2/10 or less pelvic pain with 3% inclined walking or more for 15 min.   In 5 weeks, patient will report painfree intravaginal PFM exam.  In 10 weeks, patient will report 0/10 pelvic pain with standing for 30 min or more.  In 10 weeks, pt will report 6/7 past days pain free.   In 10 weeks, patient will tolerate painfree intravaginal penetration, specifically speculum penetration.  In 10 weeks, patient will be able to perform 30 min of exercise w/ no pain and no cues required for proper breathing mechanics to indicate improved abdominal pressure mechanics and activity tolerance.       Plan    Frequency: 1x week  Duration in weeks: 10  Plan of Care beginning date: 2025  Plan of Care expiration date: 24    Subjective    Chief Complaint: Pelvic pain   HPI: Taylor John is a 34 y.o.  adult referred by OBGYN for complaints of long standing pelvic pain.  Pt notes longstanding dysmneorhea and painful uterine fibroids w/ subsequent incomplete hysterectomy in 2024. However, since procedure pt continues to be limited by pelvic/lower abdominal pain. Unsure if pain was present prior to surgery due to other severe pain at the time, but notes has not improved and feels \"clenched\" all the time.   Pt additionally notes persistant joint pain due to hypermobility and feels \"unsupported\" by muscles. Has trialed PFPT w/ external cues for PFM as discomfort associated w/ internal exam.   Lower abdominal/ pelvic girdle pain  May have been present prior hysterectomy, but has noticed ahas  0/10 current; 0/10 at best; 6/10 at worst; Sharp shooting, deep, tight ache.   Aggravated by walking, hills, standing and movement. Relieved by rest and stretching  Occupation: [employer]        Urinary:     Currently presenting with: hesitancy. Reports minimal urges to urinate throughout the day but will go by time.   Denies: AMY , UUI, dysuria, nocturia, and nocturnal enuresis    Bowel:     " Evacuates reg. [place] stool type 4. Reports intermittent pain w/ evacuation   Denies straining, bleeding, hemorrhoids, and fecal smearing   GYN:     Menstruation: s/p incomplete hysterectomy 4/15/24 2/2 painful fibroid.    Sexual Function:     Sexually Active: Not currently - however hx of pain w/ internal and external stimulation in pelvic girdle.   Hx of severe pain w/ speculum exams   MSK:      Current exercise: limited due to pain. Has trailed PT in past w/ minimal changes.    Pain:      Lower abdominal/ pelvic girdle pain  May have been present prior hysterectomy, but has noticed ahas  0/10 current; 0/10 at best; 6/10 at worst; Sharp shooting, deep, tight ache.   Aggravated by walking, hills, standing and movement. Relieved by rest and stretching   Goals:     Improve pain   Improve activity tolerance              Objective           Precautions: Standard   Problem List[1]      Diagnosis:    POC expires (Date that your POC expires) Auth Status? (BOMN, approved, pending) Unit limit (Daily) Auth Start date Expiration date PT/OT + Visit Limit?   9/1/25     Auth after 24 6/23 2.     3.     4.  5.     6.       7.     8.     9.        10.     11.     12.       13.     14.     15.     16.  17.  18.       19.     20.    21.    22.    23.    24.       25.     26. 27. 28. 29. 30.   31. 32.  33. 34. 35. 36.       Date of Service 06/23/25        [facility] Created                  Neuro Re-Ed         Urge deferral         Defecation mechanics         Breathing Mechanics Inhale relax   10'   Difficulty notes         PFM Coordination         PFM Down Training         Internal Cueing          Biofeedback                  Ther Ex         PFM Strengthening         Hip strengthening         Functional Strengthening         Abdominal Strengthening         Dilator Training         Aerobic         Therapeutic Rest Breaks         Mobility          High Impact         UE Strengthening                   Ther Activity          Voiding Diaries         Review of sxs         Fluid Intake                  Manual Ther         PFM exam  **       Ortho exam         Dynamometer Testing         Fascial Decompression         Bowel Massage         PFM stretching                  Modalities                           Outcome Measure                                   [1]   Patient Active Problem List  Diagnosis    ADHD (attention deficit hyperactivity disorder), combined type    Autism    Celiac disease    Seasonal asthma    Persistent cough    History of robot-assisted laparoscopic hysterectomy    Postop check    Dysuria    PMDD (premenstrual dysphoric disorder)

## 2025-06-22 ENCOUNTER — RESULTS FOLLOW-UP (OUTPATIENT)
Dept: OBGYN CLINIC | Facility: CLINIC | Age: 35
End: 2025-06-22

## 2025-06-23 ENCOUNTER — EVALUATION (OUTPATIENT)
Dept: PHYSICAL THERAPY | Facility: REHABILITATION | Age: 35
End: 2025-06-23
Attending: OBSTETRICS & GYNECOLOGY
Payer: COMMERCIAL

## 2025-06-23 DIAGNOSIS — M62.89 PELVIC FLOOR DYSFUNCTION: ICD-10-CM

## 2025-06-23 DIAGNOSIS — R10.2 PELVIC PAIN: ICD-10-CM

## 2025-06-23 PROCEDURE — 97110 THERAPEUTIC EXERCISES: CPT

## 2025-06-23 PROCEDURE — 97161 PT EVAL LOW COMPLEX 20 MIN: CPT

## 2025-06-28 DIAGNOSIS — K21.9 GASTROESOPHAGEAL REFLUX DISEASE, UNSPECIFIED WHETHER ESOPHAGITIS PRESENT: Primary | ICD-10-CM

## 2025-06-30 ENCOUNTER — OFFICE VISIT (OUTPATIENT)
Dept: PHYSICAL THERAPY | Facility: REHABILITATION | Age: 35
End: 2025-06-30
Attending: OBSTETRICS & GYNECOLOGY
Payer: COMMERCIAL

## 2025-06-30 DIAGNOSIS — R10.2 PELVIC PAIN: Primary | ICD-10-CM

## 2025-06-30 DIAGNOSIS — M79.7 FIBROMYALGIA: ICD-10-CM

## 2025-06-30 DIAGNOSIS — M62.89 PELVIC FLOOR DYSFUNCTION: ICD-10-CM

## 2025-06-30 PROCEDURE — 97110 THERAPEUTIC EXERCISES: CPT

## 2025-06-30 PROCEDURE — 97530 THERAPEUTIC ACTIVITIES: CPT

## 2025-06-30 NOTE — PROGRESS NOTES
"Daily Note     Today's date: 2025  Patient name: Taylor John  : 1990  MRN: [id number]  Referring provider: Alejandra Hebert  Dx:   Encounter Diagnosis     ICD-10-CM    1. Pelvic pain  R10.2       2. Pelvic floor dysfunction  M62.89       3. Fibromyalgia  M79.7                      Chief Complaint: Pelvic pain   HPI: Taylor John is a 34 y.o.  adult referred by OBGYN for complaints of long standing pelvic pain.  Pt notes longstanding dysmneorhea and painful uterine fibroids w/ subsequent incomplete hysterectomy in 2024. However, since procedure pt continues to be limited by pelvic/lower abdominal pain. Unsure if pain was present prior to surgery due to other severe pain at the time, but notes has not improved and feels \"clenched\" all the time.   Pt additionally notes persistant joint pain due to hypermobility and feels \"unsupported\" by muscles. Has trialed PFPT w/ external cues for PFM as discomfort associated w/ internal exam.   Lower abdominal/ pelvic girdle pain  May have been present prior hysterectomy  0/10 current; 0/10 at best; 6/10 at worst; Sharp shooting, deep, tight ache.   Aggravated by walking, hills, standing and movement. 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 Continued discomfort surrounding exam. Notes \"clenched\" feeling in pelvic girdle.       Objective: See treatment diary below      Assessment: Tolerated treatment well. PFM assessment deferred today 2/2 discomfort. Pt reports emotional distress associated w/ internal exam - reviewed process and information gathered from exam. Deferred to next session if amenable. Reassured pt that PFM assessment is pending consent and comfort. Progressed remedial TE and mobility today for lower abdominal/ pelvic girdle discomfort. Introduced pball mobility.  Patient would benefit from continued PT.      Plan: Continue per plan of care       Precautions: " Standard   Problem List[1]      Diagnosis:    POC expires (Date that your POC expires) Auth Status? (BOMN, approved, pending) Unit limit (Daily) Auth Start date Expiration date PT/OT + Visit Limit?   9/1/25     Auth after 24 6/23 2.    6/30 3.     4.  5.     6.       7.     8.     9.        10.     11.     12.       13.     14.     15.     16.  17.  18.       19.     20.    21.    22.    23.    24.       25.     26. 27. 28. 29. 30.   31. 32.  33. 34. 35. 36.       Date of Service 06/23/25 6/30       [facility] Created                  Neuro Re-Ed         Urge deferral         Defecation mechanics         Breathing Mechanics Inhale relax   10'   Difficulty noted        PFM Coordination         PFM Down Training         Internal Cueing          Biofeedback                  Ther Ex         PFM Strengthening         Hip strengthening         Functional Strengthening         Abdominal Strengthening         Dilator Training         Aerobic  Nu Step L3 5'        Therapeutic Rest Breaks         Mobility   LTR x1'     SKTC 1'     Pball pelvic tilts, pelvic circles and lumbar distraction 1'        High Impact         UE Strengthening                   Ther Activity         Voiding Diaries         Review of sxs         Fluid Intake           Reviewed PFM assessment process 30'        Manual Ther         PFM exam   **      Ortho exam         Dynamometer Testing         Fascial Decompression         Bowel Massage         PFM stretching                  Modalities                           Outcome Measure                                      [1]   Patient Active Problem List  Diagnosis    ADHD (attention deficit hyperactivity disorder), combined type    Autism    Celiac disease    Seasonal asthma    Persistent cough    History of robot-assisted laparoscopic hysterectomy    Postop check    Dysuria    PMDD (premenstrual dysphoric disorder)

## 2025-07-01 RX ORDER — FAMOTIDINE 40 MG/1
40 TABLET, FILM COATED ORAL 2 TIMES DAILY
Qty: 90 TABLET | Refills: 0 | Status: SHIPPED | OUTPATIENT
Start: 2025-07-01

## 2025-07-07 ENCOUNTER — APPOINTMENT (OUTPATIENT)
Dept: PHYSICAL THERAPY | Facility: REHABILITATION | Age: 35
End: 2025-07-07
Attending: OBSTETRICS & GYNECOLOGY
Payer: COMMERCIAL

## 2025-07-14 ENCOUNTER — OFFICE VISIT (OUTPATIENT)
Dept: PHYSICAL THERAPY | Facility: REHABILITATION | Age: 35
End: 2025-07-14
Attending: OBSTETRICS & GYNECOLOGY
Payer: COMMERCIAL

## 2025-07-14 DIAGNOSIS — R10.2 PELVIC PAIN: Primary | ICD-10-CM

## 2025-07-14 DIAGNOSIS — M79.7 FIBROMYALGIA: ICD-10-CM

## 2025-07-14 DIAGNOSIS — M62.89 PELVIC FLOOR DYSFUNCTION: ICD-10-CM

## 2025-07-14 PROCEDURE — 97530 THERAPEUTIC ACTIVITIES: CPT

## 2025-07-14 PROCEDURE — 97110 THERAPEUTIC EXERCISES: CPT

## 2025-07-14 NOTE — PROGRESS NOTES
"Daily Note     Today's date: 2025  Patient name: Taylor John  : 1990  MRN: [id number]  Referring provider: Alejandra Hebert  Dx:   Encounter Diagnosis     ICD-10-CM    1. Pelvic pain  R10.2       2. Pelvic floor dysfunction  M62.89       3. Fibromyalgia  M79.7                      Chief Complaint: Pelvic pain   HPI: Taylor John is a 34 y.o.  adult referred by OBGYN for complaints of long standing pelvic pain.  Pt notes longstanding dysmneorhea and painful uterine fibroids w/ subsequent incomplete hysterectomy in 2024. However, since procedure pt continues to be limited by pelvic/lower abdominal pain. Unsure if pain was present prior to surgery due to other severe pain at the time, but notes has not improved and feels \"clenched\" all the time.   Pt additionally notes persistant joint pain due to hypermobility and feels \"unsupported\" by muscles. Has trialed PFPT w/ external cues for PFM as discomfort associated w/ internal exam.   Lower abdominal/ pelvic girdle pain  May have been present prior hysterectomy  0/10 current; 0/10 at best; 6/10 at worst; Sharp shooting, deep, tight ache.   Aggravated by walking, hills, standing and movement. Relieved by rest and stretching  Occupation: [id number]          Subjective: Feels a high pain day today. Reported exhaustion following previous session. Continued discomfort surrounding exam.      Objective: See treatment diary below      Assessment: Tolerated treatment well. PFM assessment deferred today 2/2 discomfort. Pt reports emotional distress associated w/ internal exam and external palpation globally. Discussed w/ pt it may be beneficial to discuss w/ mental health therapist tactics for progression of therapy at a pace that is comfortable to pt. Discussed therapeutic stages possible prior to exam. Additionally, educated pt on resource for reported dizziness affected STS, transfers, and felt has increased difficulty of driving. Provided " handout for Neuro PT.     Continued w/ nu step w/ no resistance and introduced arm bike. Educated pt on activating muscle throughout the day and limited time spent in end range of joints. Patient would benefit from continued PT.        Plan: Continue per plan of care       Precautions: Standard   Problem List[1]      Diagnosis:    POC expires (Date that your POC expires) Auth Status? (BOMN, approved, pending) Unit limit (Daily) Auth Start date Expiration date PT/OT + Visit Limit?   9/1/25     Auth after 24 6/23 2.    6/30 3.    7/14 4.  5.     6.       7.     8.     9.        10.     11.     12.       13.     14.     15.     16.  17.  18.       19.     20.    21.    22.    23.    24.       25.     26. 27. 28. 29. 30.   31. 32.  33. 34. 35. 36.       Date of Service 06/23/25 6/30 7/14      [employer] Created                  Neuro Re-Ed         Urge deferral         Defecation mechanics         Breathing Mechanics Inhale relax   10'   Difficulty noted        PFM Coordination         PFM Down Training         Internal Cueing          Biofeedback                  Ther Ex         PFM Strengthening         Hip strengthening         Functional Strengthening         Abdominal Strengthening         Dilator Training         Aerobic  Nu Step L3 5'  8'  L1     UE Bike 5'       Therapeutic Rest Breaks         Mobility   LTR x1'     SKTC 1'     Pball pelvic tilts, pelvic circles and lumbar distraction 1'        High Impact         UE Strengthening                   Ther Activity         Voiding Diaries         Review of sxs         Fluid Intake           Reviewed PFM assessment process 30'  See assessment   30'         Manual Ther         PFM exam         Ortho exam         Dynamometer Testing         Fascial Decompression         Bowel Massage         PFM stretching                  Modalities                           Outcome Measure                                      [1]   Patient Active Problem List  Diagnosis     ADHD (attention deficit hyperactivity disorder), combined type    Autism    Celiac disease    Seasonal asthma    Persistent cough    History of robot-assisted laparoscopic hysterectomy    Postop check    Dysuria    PMDD (premenstrual dysphoric disorder)

## 2025-07-21 ENCOUNTER — OFFICE VISIT (OUTPATIENT)
Dept: PHYSICAL THERAPY | Facility: REHABILITATION | Age: 35
End: 2025-07-21
Attending: OBSTETRICS & GYNECOLOGY
Payer: COMMERCIAL

## 2025-07-21 DIAGNOSIS — M79.7 FIBROMYALGIA: ICD-10-CM

## 2025-07-21 DIAGNOSIS — R10.2 PELVIC PAIN: Primary | ICD-10-CM

## 2025-07-21 DIAGNOSIS — M62.89 PELVIC FLOOR DYSFUNCTION: ICD-10-CM

## 2025-07-21 PROCEDURE — 97110 THERAPEUTIC EXERCISES: CPT

## 2025-07-21 NOTE — PROGRESS NOTES
"Daily Note     Today's date: 2025  Patient name: Taylor John  : 1990  MRN: [id number]  Referring provider: Alejandra Hebert  Dx:   Encounter Diagnosis     ICD-10-CM    1. Pelvic pain  R10.2       2. Pelvic floor dysfunction  M62.89       3. Fibromyalgia  M79.7             Start Time: 1500  Stop Time: 1545  Total time in clinic (min): 45 minutes    Chief Complaint: Pelvic pain   HPI: Taylor John is a 34 y.o.  adult referred by OBGYN for complaints of long standing pelvic pain.  Pt notes longstanding dysmneorhea and painful uterine fibroids w/ subsequent incomplete hysterectomy in 2024. However, since procedure pt continues to be limited by pelvic/lower abdominal pain. Unsure if pain was present prior to surgery due to other severe pain at the time, but notes has not improved and feels \"clenched\" all the time.   Pt additionally notes persistant joint pain due to hypermobility and feels \"unsupported\" by muscles. Has trialed PFPT w/ external cues for PFM as discomfort associated w/ internal exam.   Lower abdominal/ pelvic girdle pain  May have been present prior hysterectomy  0/10 current; 0/10 at best; 6/10 at worst; Sharp shooting, deep, tight ache.   Aggravated by walking, hills, standing and movement. Relieved by rest and stretching  Occupation: Freelance Artist          Subjective: Felt good after previous session. Feels may be ready to discuss PFM assessment in ~ 3 sessions.       Objective: See treatment diary below      Assessment: Tolerated treatment well. Continued w/ nustep and UE bike. Introduced muscle activation and light resistance training w/ fatigue in target groups. Reinforced not spending time in joint end ranges. Patient would benefit from continued PT. Next visit, progress as tolerated, may benefit from incorporated foot intrinsic strengthening.         Plan: Continue per plan of care       Precautions: Standard   Problem List[1]      Diagnosis:    POC expires (Date " that your POC expires) Auth Status? (BOMN, approved, pending) Unit limit (Daily) Auth Start date Expiration date PT/OT + Visit Limit?   9/1/25     Auth after 24 6/23 2.    6/30 3.    7/14 4. 7/21 5.     6.       7.     8.     9.        10.     11.     12.       13.     14.     15.     16.  17.  18.       19.     20.    21.    22.    23.    24.       25.     26. 27. 28. 29. 30.   31. 32.  33. 34. 35. 36.       Date of Service 06/23/25 6/30 7/14 7/21     [employer] Created                  Neuro Re-Ed         Urge deferral         Defecation mechanics         Breathing Mechanics Inhale relax   10'   Difficulty noted        PFM Coordination         PFM Down Training         Internal Cueing          Biofeedback                  Ther Ex         PFM Strengthening         Hip strengthening         Functional Strengthening         Abdominal Strengthening         Dilator Training         Aerobic  Nu Step L3 5'  8'  L1     UE Bike 5'  UE bike 4'     Nustep 6'      Therapeutic Rest Breaks         Mobility   LTR x1'     SKTC 1'     Pball pelvic tilts, pelvic circles and lumbar distraction 1'   Bicep curl #2 x10     Weight shifts at bar x15     Heel raise x10     Scap retractions w/ cervical ex x10     Shoulder activation w/  x3      High Impact         UE Strengthening               Foot intrinsic **     Ther Activity         Voiding Diaries         Review of sxs         Fluid Intake           Reviewed PFM assessment process 30'  See assessment   30'         Manual Ther         PFM exam         Ortho exam         Dynamometer Testing         Fascial Decompression         Bowel Massage         PFM stretching                  Modalities                           Outcome Measure                                        [1]   Patient Active Problem List  Diagnosis    ADHD (attention deficit hyperactivity disorder), combined type    Autism    Celiac disease    Seasonal asthma    Persistent cough    History of  robot-assisted laparoscopic hysterectomy    Postop check    Dysuria    PMDD (premenstrual dysphoric disorder)

## 2025-07-28 ENCOUNTER — OFFICE VISIT (OUTPATIENT)
Dept: PHYSICAL THERAPY | Facility: REHABILITATION | Age: 35
End: 2025-07-28
Attending: OBSTETRICS & GYNECOLOGY
Payer: COMMERCIAL

## 2025-07-28 DIAGNOSIS — M79.7 FIBROMYALGIA: ICD-10-CM

## 2025-07-28 DIAGNOSIS — R10.2 PELVIC PAIN: ICD-10-CM

## 2025-07-28 DIAGNOSIS — M62.89 PELVIC FLOOR DYSFUNCTION: Primary | ICD-10-CM

## 2025-07-28 PROCEDURE — 97110 THERAPEUTIC EXERCISES: CPT

## 2025-07-30 ENCOUNTER — TELEPHONE (OUTPATIENT)
Dept: OBGYN CLINIC | Facility: CLINIC | Age: 35
End: 2025-07-30

## 2025-08-05 ENCOUNTER — OFFICE VISIT (OUTPATIENT)
Dept: OBGYN CLINIC | Facility: CLINIC | Age: 35
End: 2025-08-05
Payer: COMMERCIAL

## 2025-08-05 VITALS — DIASTOLIC BLOOD PRESSURE: 78 MMHG | SYSTOLIC BLOOD PRESSURE: 122 MMHG | WEIGHT: 125.4 LBS | BODY MASS INDEX: 24.49 KG/M2

## 2025-08-05 DIAGNOSIS — N95.1 PERIMENOPAUSAL VASOMOTOR SYMPTOMS: ICD-10-CM

## 2025-08-05 DIAGNOSIS — F32.81 PMDD (PREMENSTRUAL DYSPHORIC DISORDER): ICD-10-CM

## 2025-08-05 DIAGNOSIS — N80.9 ENDOMETRIOSIS: ICD-10-CM

## 2025-08-05 DIAGNOSIS — E34.9 HORMONE IMBALANCE: Primary | ICD-10-CM

## 2025-08-05 DIAGNOSIS — R10.2 PELVIC PAIN: ICD-10-CM

## 2025-08-05 DIAGNOSIS — F64.8 OTHER GENDER IDENTITY DISORDERS: ICD-10-CM

## 2025-08-05 PROCEDURE — 99214 OFFICE O/P EST MOD 30 MIN: CPT | Performed by: OBSTETRICS & GYNECOLOGY

## 2025-08-05 RX ORDER — TESTOSTERONE GEL, 1% 10 MG/G
25 GEL TRANSDERMAL DAILY
Qty: 30 PACKET | Refills: 2 | Status: SHIPPED | OUTPATIENT
Start: 2025-08-05 | End: 2025-08-07

## 2025-08-06 ENCOUNTER — TELEPHONE (OUTPATIENT)
Age: 35
End: 2025-08-06

## 2025-08-07 DIAGNOSIS — E34.9 HORMONE IMBALANCE: Primary | ICD-10-CM

## 2025-08-07 DIAGNOSIS — F64.8 OTHER GENDER IDENTITY DISORDERS: ICD-10-CM

## 2025-08-07 DIAGNOSIS — N95.1 PERIMENOPAUSAL VASOMOTOR SYMPTOMS: ICD-10-CM

## 2025-08-07 DIAGNOSIS — R10.2 PELVIC PAIN: ICD-10-CM

## 2025-08-07 DIAGNOSIS — F32.81 PMDD (PREMENSTRUAL DYSPHORIC DISORDER): ICD-10-CM

## 2025-08-07 RX ORDER — TESTOSTERONE 1.62 MG/G
20.25 GEL TRANSDERMAL DAILY
Qty: 90 ACTUATION | Refills: 0 | Status: SHIPPED | OUTPATIENT
Start: 2025-08-07

## 2025-08-14 ENCOUNTER — OFFICE VISIT (OUTPATIENT)
Dept: PHYSICAL THERAPY | Facility: REHABILITATION | Age: 35
End: 2025-08-14
Attending: OBSTETRICS & GYNECOLOGY
Payer: COMMERCIAL

## 2025-08-19 ENCOUNTER — OFFICE VISIT (OUTPATIENT)
Dept: PHYSICAL THERAPY | Facility: REHABILITATION | Age: 35
End: 2025-08-19
Attending: OBSTETRICS & GYNECOLOGY
Payer: COMMERCIAL

## 2025-08-19 DIAGNOSIS — M62.89 PELVIC FLOOR DYSFUNCTION: Primary | ICD-10-CM

## 2025-08-19 DIAGNOSIS — M79.7 FIBROMYALGIA: ICD-10-CM

## 2025-08-19 DIAGNOSIS — R10.2 PELVIC PAIN: ICD-10-CM

## 2025-08-19 PROCEDURE — 97110 THERAPEUTIC EXERCISES: CPT

## (undated) DEVICE — TRAP,MUCUS SPECIMEN, 80CC: Brand: MEDLINE

## (undated) DEVICE — PVC URETHRAL CATHETER: Brand: DOVER

## (undated) DEVICE — KIT, BETHLEHEM ROBOTIC PROST: Brand: CARDINAL HEALTH

## (undated) DEVICE — SUT STRATAFIX SPIRAL 2-0 CT-1 30 CM SXPP1B410

## (undated) DEVICE — BLADELESS OBTURATOR: Brand: WECK VISTA

## (undated) DEVICE — HEAVY DUTY TABLE COVER: Brand: CONVERTORS

## (undated) DEVICE — ADHESIVE SKIN HIGH VISCOSITY EXOFIN 1ML

## (undated) DEVICE — SYNCHROSEAL: Brand: DA VINCI ENERGY

## (undated) DEVICE — CANNULA SEAL

## (undated) DEVICE — TRAY FOLEY 16FR URIMETER SILICONE SURESTEP

## (undated) DEVICE — SUT VICRYL 0 UR-6 27 IN J603H

## (undated) DEVICE — PROGRASP FORCEPS: Brand: ENDOWRIST

## (undated) DEVICE — GLOVE SRG BIOGEL 6.5

## (undated) DEVICE — ASTOUND STANDARD SURGICAL GOWN, XL: Brand: CONVERTORS

## (undated) DEVICE — LARGE NEEDLE DRIVER: Brand: ENDOWRIST

## (undated) DEVICE — CHLORHEXIDINE 4PCT 4 OZ

## (undated) DEVICE — DRAPE SHEET THREE QUARTER

## (undated) DEVICE — BETHLEHEM UNIVERSAL MINOR VAG: Brand: CARDINAL HEALTH

## (undated) DEVICE — COLUMN DRAPE

## (undated) DEVICE — TOWEL SURG XR DETECT GREEN STRL RFD

## (undated) DEVICE — FLEX ADVANTAGE 3000CC: Brand: FLEX ADVANTAGE

## (undated) DEVICE — DECANTER: Brand: UNBRANDED

## (undated) DEVICE — TISSUE RETRIEVAL SYSTEM: Brand: INZII RETRIEVAL SYSTEM

## (undated) DEVICE — SUT MONOCRYL 4-0 PS-2 27 IN Y426H

## (undated) DEVICE — UTERINE MANIPULATOR RUMI 6.7 X 10 CM

## (undated) DEVICE — INSUFFLATION NEEDLE TO ESTABLISH PNEUMOPERITONEUM.: Brand: INSUFFLATION NEEDLE

## (undated) DEVICE — TIP COVER ACCESSORY

## (undated) DEVICE — MONOPOLAR CURVED SCISSORS: Brand: ENDOWRIST

## (undated) DEVICE — SYRINGE 50ML LL

## (undated) DEVICE — INTENDED FOR TISSUE SEPARATION, AND OTHER PROCEDURES THAT REQUIRE A SHARP SURGICAL BLADE TO PUNCTURE OR CUT.: Brand: BARD-PARKER SAFETY BLADES SIZE 11, STERILE

## (undated) DEVICE — PREMIUM DRY TRAY LF: Brand: MEDLINE INDUSTRIES, INC.

## (undated) DEVICE — TROCAR PORT ACCESS 8 X100MML W BLDLS OPTICAL TIP AIRSEAL

## (undated) DEVICE — AIRSEAL TUBE SMOKE EVAC LUMENX3 FILTERED

## (undated) DEVICE — SURGICEL 2 X 3

## (undated) DEVICE — VISUALIZATION SYSTEM: Brand: CLEARIFY

## (undated) DEVICE — ARM DRAPE

## (undated) DEVICE — 1820 FOAM BLOCK NEEDLE COUNTER: Brand: DEVON

## (undated) DEVICE — OCCLUDER COLPO-PNEUMO

## (undated) DEVICE — SPONGE 4 X 4 XRAY 16 PLY STRL LF RFD

## (undated) DEVICE — 40595 XL TRENDELENBURG POSITIONING KIT: Brand: 40595 XL TRENDELENBURG POSITIONING KIT

## (undated) DEVICE — GLOVE INDICATOR PI UNDERGLOVE SZ 6.5 BLUE